# Patient Record
Sex: MALE | Race: WHITE | Employment: FULL TIME | URBAN - METROPOLITAN AREA
[De-identification: names, ages, dates, MRNs, and addresses within clinical notes are randomized per-mention and may not be internally consistent; named-entity substitution may affect disease eponyms.]

---

## 2017-01-03 PROBLEM — G43.109 COMPLICATED MIGRAINE: Status: ACTIVE | Noted: 2017-01-03

## 2017-01-03 NOTE — PROGRESS NOTES
Eloisa [address]   Neurology;   Follow up  CLINIC VISIT  2017    Amaury Vasques Patient Status:  No patient class for patient encounter    1990 MRN [id number]   Location [address] [place] [address] BETTIE Romero DO     REASON FOR COLONOSCOPY         FAMILY HISTORY:  family history includes Cancer in his maternal grandfather; Hypertension in his maternal grandmother. SOCIAL HISTORY:   reports that he has never smoked.  He has never used smokeless tobacco. He reports that he does n conjunctiva, no abnormal secretion,   Neck supple,  No carotid bruit,  thyroid normal  Lungs are clear to auscultation  Heart: normal SR, no murmur  Extremities:  No edema or cyanosis, pulse is normal.  Skin:  No unusual lesions    Neurologic Examination: quadrant         Relevant Medications     Amitriptyline HCl 100 MG Oral Tab           Impression/Plan:  (F51.01) Primary insomnia  (primary encounter diagnosis)    (G44.029) Chronic cluster headache, not intractable    (R20.2) Tingling in extremities    (H

## 2017-01-03 NOTE — PATIENT INSTRUCTIONS
Refill policies:    • Allow 2 business days for refills; controlled substances may take longer.   • Contact your pharmacy at least 5 days prior to running out of medication and have them send an electronic request or submit request through the “request re your physician has recommended that you have a procedure or additional testing performed. [facility]) will contact your insurance carrier to obtain pre-certification or prior authorization.     Unfortunately, ROS has seen an increas

## 2017-02-01 ENCOUNTER — HOSPITAL ENCOUNTER (OUTPATIENT)
Dept: NUCLEAR MEDICINE | Facility: HOSPITAL | Age: 27
Discharge: HOME | End: 2017-02-01
Attending: INTERNAL MEDICINE
Payer: COMMERCIAL

## 2017-02-01 DIAGNOSIS — R19.7 DIARRHEA: ICD-10-CM

## 2017-02-01 DIAGNOSIS — R10.12 ABDOMINAL PAIN, LEFT UPPER QUADRANT: ICD-10-CM

## 2017-02-01 PROCEDURE — 78227 HEPATOBIL SYST IMAGE W/DRUG: CPT

## 2017-02-03 NOTE — PROGRESS NOTES
Quick Note:    The gallbladder emptying scan showed a normal GB ejection fraction of 63%. Mild reproduction of abdominal pain is noted. Please have the pt do an abdominal ultrasound to assess for gallstones or other hepatobiliary pathology.     Proceed

## 2017-04-14 PROCEDURE — 88305 TISSUE EXAM BY PATHOLOGIST: CPT | Performed by: INTERNAL MEDICINE

## 2017-05-11 ENCOUNTER — PATIENT MESSAGE (OUTPATIENT)
Dept: NEUROLOGY | Facility: CLINIC | Age: 27
End: 2017-05-11

## 2017-05-11 RX ORDER — AMITRIPTYLINE HYDROCHLORIDE 100 MG/1
TABLET, FILM COATED ORAL
Qty: 30 TABLET | Refills: 0 | Status: SHIPPED | OUTPATIENT
Start: 2017-05-11 | End: 2017-06-20

## 2017-05-11 NOTE — TELEPHONE ENCOUNTER
From: Cara Mabry MA  To: Agustin Guerra  Sent: 5/11/2017 8:05 AM CDT  Subject: appointment and medication refill    Liang Ramirez,    We receive a refill request on medication Amitriptyline .  According to our records you are due for appointment with one of o

## 2017-05-22 PROCEDURE — 83655 ASSAY OF LEAD: CPT | Performed by: INTERNAL MEDICINE

## 2017-06-20 DIAGNOSIS — G44.029 CHRONIC CLUSTER HEADACHE, NOT INTRACTABLE: Primary | ICD-10-CM

## 2017-06-20 RX ORDER — AMITRIPTYLINE HYDROCHLORIDE 100 MG/1
TABLET, FILM COATED ORAL
Qty: 30 TABLET | Refills: 0 | Status: SHIPPED | OUTPATIENT
Start: 2017-06-20 | End: 2017-06-26

## 2017-06-20 NOTE — TELEPHONE ENCOUNTER
Medication: Amitriptyline 100 mg     Date of last refill: 5/11/17  [place]    Last office visit: 1/3/17  Due back to clinic per last office note: 3 months   Date next office visit scheduled: 6/26/17    Last OV note re

## 2017-06-24 PROCEDURE — 36415 COLL VENOUS BLD VENIPUNCTURE: CPT | Performed by: INTERNAL MEDICINE

## 2017-06-24 PROCEDURE — 85048 AUTOMATED LEUKOCYTE COUNT: CPT | Performed by: INTERNAL MEDICINE

## 2017-06-26 ENCOUNTER — OFFICE VISIT (OUTPATIENT)
Dept: NEUROLOGY | Facility: CLINIC | Age: 27
End: 2017-06-26

## 2017-06-26 VITALS
SYSTOLIC BLOOD PRESSURE: 117 MMHG | HEART RATE: 91 BPM | DIASTOLIC BLOOD PRESSURE: 79 MMHG | BODY MASS INDEX: 30 KG/M2 | WEIGHT: 217 LBS | RESPIRATION RATE: 14 BRPM

## 2017-06-26 DIAGNOSIS — G43.109 COMPLICATED MIGRAINE: ICD-10-CM

## 2017-06-26 DIAGNOSIS — M54.6 MIDLINE THORACIC BACK PAIN, UNSPECIFIED CHRONICITY: ICD-10-CM

## 2017-06-26 DIAGNOSIS — F51.01 PRIMARY INSOMNIA: ICD-10-CM

## 2017-06-26 DIAGNOSIS — G44.029 CHRONIC CLUSTER HEADACHE, NOT INTRACTABLE: ICD-10-CM

## 2017-06-26 DIAGNOSIS — R20.2 TINGLING IN EXTREMITIES: Primary | ICD-10-CM

## 2017-06-26 DIAGNOSIS — G44.1 OTHER VASCULAR HEADACHE: ICD-10-CM

## 2017-06-26 PROCEDURE — 99214 OFFICE O/P EST MOD 30 MIN: CPT | Performed by: OTHER

## 2017-06-26 RX ORDER — AMITRIPTYLINE HYDROCHLORIDE 100 MG/1
TABLET, FILM COATED ORAL
Qty: 30 TABLET | Refills: 11 | Status: SHIPPED | OUTPATIENT
Start: 2017-06-26 | End: 2018-06-25

## 2017-06-26 NOTE — PATIENT INSTRUCTIONS
Refill policies:    • Allow 2-3 business days for refills; controlled substances may take longer.   • Contact your pharmacy at least 5 days prior to running out of medication and have them send an electronic request or submit request through the [facility] have a procedure or additional testing performed. [facility]) will contact your insurance carrier to obtain pre-certification or prior authorization.     Unfortunately, ROS has seen an increase in denial of payment even though the p

## 2017-06-26 NOTE — PROGRESS NOTES
Eloisa [address]   Neurology;   Follow up  CLINIC VISIT  2017    Sascha Perez Patient Status:  No patient class for patient encounter    1990 MRN [id number]   Location [address] [place] [address] Rose Mary Rivero DO     REASON FOR smoked. He has never used smokeless tobacco. He reports that he does not drink alcohol or use drugs.     ALLERGIES:  No Known Allergies    MEDICATIONS:    Current Outpatient Prescriptions:  Amitriptyline HCl 100 MG Oral Tab TAKE 1 TABLET(100 MG) BY MOUTH EV old male in no acute distress. appearance: Normal developed and well nourished , in no acute stress;   HEENT:  Normal conjunctiva, no abnormal secretion,   Neck supple,  No carotid bruit,  thyroid normal  Lungs are clear to auscultation  Heart: normal SR, Insomnia disorder    Complicated migraine      Other Visit Diagnoses    None.          Impression/Plan:  (R20.2) Tingling in extremities  (primary encounter diagnosis)    (F51.01) Primary insomnia    (G44.1) Other vascular headache    (G44.029) Chronic clus

## 2017-12-07 ENCOUNTER — TELEPHONE (OUTPATIENT)
Dept: NEUROLOGY | Facility: CLINIC | Age: 27
End: 2017-12-07

## 2017-12-07 NOTE — TELEPHONE ENCOUNTER
Per Dr Yashira Mac : Can call for him based on my last note. Spoke with representative at [facility]. She initiated process for Neurology referral. She will give patient a call to complete referral questionnaire.      Left a detailed message on patient's

## 2017-12-07 NOTE — TELEPHONE ENCOUNTER
Regarding: Referral Request  Contact: [phone]  ----- Message from Juanita Ramírez RN sent at 12/6/2017  8:46 AM CST -----       ----- Message from Charlotte Will to Ottoniel Martinez MD sent at 11/19/2017  9:23 PM -----   Dr. Bon Ohara,    When we last met, you agr

## 2018-01-09 ENCOUNTER — TELEPHONE (OUTPATIENT)
Dept: NEUROLOGY | Facility: CLINIC | Age: 28
End: 2018-01-09

## 2018-05-07 ENCOUNTER — OFFICE VISIT (OUTPATIENT)
Dept: INTERNAL MEDICINE CLINIC | Facility: CLINIC | Age: 28
End: 2018-05-07

## 2018-05-07 VITALS
HEIGHT: 71 IN | WEIGHT: 211 LBS | TEMPERATURE: 98 F | BODY MASS INDEX: 29.54 KG/M2 | HEART RATE: 111 BPM | OXYGEN SATURATION: 98 % | DIASTOLIC BLOOD PRESSURE: 76 MMHG | SYSTOLIC BLOOD PRESSURE: 116 MMHG

## 2018-05-07 DIAGNOSIS — Z00.00 ANNUAL PHYSICAL EXAM: ICD-10-CM

## 2018-05-07 DIAGNOSIS — R21 RASH: Primary | ICD-10-CM

## 2018-05-07 PROCEDURE — 99212 OFFICE O/P EST SF 10 MIN: CPT | Performed by: INTERNAL MEDICINE

## 2018-05-07 PROCEDURE — 99213 OFFICE O/P EST LOW 20 MIN: CPT | Performed by: INTERNAL MEDICINE

## 2018-05-07 RX ORDER — ELECTROLYTES/DEXTROSE
SOLUTION, ORAL ORAL
COMMUNITY
Start: 2018-05-01

## 2018-05-07 RX ORDER — GABAPENTIN 300 MG/1
300 CAPSULE ORAL 3 TIMES DAILY
Refills: 3 | COMMUNITY
Start: 2018-04-06 | End: 2018-06-25

## 2018-05-07 RX ORDER — NYSTATIN 10B UNIT
1 POWDER (EA) MISCELLANEOUS 3 TIMES DAILY
Qty: 1 BOTTLE | Refills: 0 | Status: SHIPPED | OUTPATIENT
Start: 2018-05-07 | End: 2018-06-25

## 2018-05-07 RX ORDER — ACETAMINOPHEN 500 MG
500 TABLET ORAL EVERY 6 HOURS PRN
COMMUNITY

## 2018-05-08 NOTE — PROGRESS NOTES
Maite Fonseca is a 33year old male. Patient presents with: Other: Complaints of dry skin and discoloration under B/L underarms w/ left being worse than right. HPI:   Maite Fonseca is a 33year old male who presents for: discoloration under armpit. REVIEW OF SYSTEMS:   GENERAL: feels well otherwise  SKIN: reports rash    EXAM:   /76 (BP Location: Left arm, Patient Position: Sitting, Cuff Size: adult)   Pulse 111   Temp 98.4 °F (36.9 °C) (Oral)   Ht 5' 11\" (1.803 m)   Wt 211 lb (9

## 2018-05-19 ENCOUNTER — LAB ENCOUNTER (OUTPATIENT)
Dept: LAB | Facility: HOSPITAL | Age: 28
End: 2018-05-19
Attending: INTERNAL MEDICINE
Payer: COMMERCIAL

## 2018-05-19 DIAGNOSIS — Z00.00 ANNUAL PHYSICAL EXAM: ICD-10-CM

## 2018-05-19 PROCEDURE — 83036 HEMOGLOBIN GLYCOSYLATED A1C: CPT

## 2018-05-19 PROCEDURE — 80061 LIPID PANEL: CPT

## 2018-05-19 PROCEDURE — 80053 COMPREHEN METABOLIC PANEL: CPT

## 2018-05-19 PROCEDURE — 36415 COLL VENOUS BLD VENIPUNCTURE: CPT

## 2018-05-19 PROCEDURE — 85025 COMPLETE CBC W/AUTO DIFF WBC: CPT

## 2018-05-21 ENCOUNTER — TELEPHONE (OUTPATIENT)
Dept: INTERNAL MEDICINE CLINIC | Facility: CLINIC | Age: 28
End: 2018-05-21

## 2018-06-25 ENCOUNTER — OFFICE VISIT (OUTPATIENT)
Dept: NEUROLOGY | Facility: CLINIC | Age: 28
End: 2018-06-25

## 2018-06-25 VITALS
HEART RATE: 96 BPM | RESPIRATION RATE: 16 BRPM | BODY MASS INDEX: 29.68 KG/M2 | HEIGHT: 71 IN | WEIGHT: 212 LBS | SYSTOLIC BLOOD PRESSURE: 110 MMHG | DIASTOLIC BLOOD PRESSURE: 80 MMHG

## 2018-06-25 DIAGNOSIS — F51.01 PRIMARY INSOMNIA: ICD-10-CM

## 2018-06-25 DIAGNOSIS — G44.029 CHRONIC CLUSTER HEADACHE, NOT INTRACTABLE: ICD-10-CM

## 2018-06-25 DIAGNOSIS — M54.6 MIDLINE THORACIC BACK PAIN, UNSPECIFIED CHRONICITY: ICD-10-CM

## 2018-06-25 DIAGNOSIS — G43.109 COMPLICATED MIGRAINE: ICD-10-CM

## 2018-06-25 DIAGNOSIS — R20.2 TINGLING IN EXTREMITIES: Primary | ICD-10-CM

## 2018-06-25 PROCEDURE — 99215 OFFICE O/P EST HI 40 MIN: CPT | Performed by: OTHER

## 2018-06-25 RX ORDER — AMITRIPTYLINE HYDROCHLORIDE 100 MG/1
TABLET, FILM COATED ORAL
Qty: 30 TABLET | Refills: 11 | Status: SHIPPED | OUTPATIENT
Start: 2018-06-25 | End: 2019-05-14

## 2018-06-25 RX ORDER — GABAPENTIN 300 MG/1
600 CAPSULE ORAL 3 TIMES DAILY
Qty: 180 CAPSULE | Refills: 11 | Status: SHIPPED | OUTPATIENT
Start: 2018-06-25 | End: 2019-05-14

## 2018-06-25 NOTE — PROGRESS NOTES
Eloisa [address]27   Neurology;   Follow up  CLINIC VISIT  2018    Shayy Coon Patient Status:  No patient class for patient encounter    1990 MRN [id number]   Location [address] [place] [address] Sharon Come, DO     REASON FOR [id number], [id number];  Surgeon: Merly Arana MD;  Location: [facility] [place]    FAMILY HISTORY:  family history includes Cancer in his maternal grandfather; Hypertension in his maternal grandmother.     SOCIAL HISTORY:   reports that he h EXAMINATION:  VITAL SIGNS: /80 (BP Location: Right arm, Patient Position: Sitting, Cuff Size: adult)   Pulse 96   Resp 16   Ht 71\"   Wt 212 lb   BMI 29.57 kg/m²    Body mass index is 29.57 kg/m².   General:  Patient is a 33year old male in no acute thoracic and Lumbar spine last year normal report,     Reviewed all consultation letters from [facility] clinic with him.    EMG was normal there    Problem List Items Addressed This Visit     HA (headache)    Relevant Medications    Amitriptyline HCl 100 MG Oral treatment. The patient was  given ample opportunity to ask questions. All questions and concerns were addressed.      Yosef Hannah MD  General Neurology   Neuromuscular/ Electrodiagnostic Specialist  [facility]  06/25/2018

## 2018-06-25 NOTE — PROGRESS NOTES
Patient is here for neuropathy. Patient stated that he went to [facility] clinic in early April and wants to go over everything with Dr. Anant Basilio.

## 2018-11-28 ENCOUNTER — TELEPHONE (OUTPATIENT)
Dept: INTERNAL MEDICINE CLINIC | Facility: CLINIC | Age: 28
End: 2018-11-28

## 2018-11-28 NOTE — TELEPHONE ENCOUNTER
Called patient and relayed  Message - verbalized understanding. Number and address for DR. Jeana Roberts given to patient .  Cielo for 11/29 cancelled with

## 2018-11-28 NOTE — TELEPHONE ENCOUNTER
I saw the reason for the appointment for tomorrow; he would be better served by seeing a urologist: would recommend Dr. Yg Sage with [facility].

## 2019-05-13 ENCOUNTER — TELEPHONE (OUTPATIENT)
Dept: NEUROLOGY | Facility: CLINIC | Age: 29
End: 2019-05-13

## 2019-05-14 ENCOUNTER — OFFICE VISIT (OUTPATIENT)
Dept: NEUROLOGY | Facility: CLINIC | Age: 29
End: 2019-05-14
Payer: COMMERCIAL

## 2019-05-14 VITALS
SYSTOLIC BLOOD PRESSURE: 122 MMHG | DIASTOLIC BLOOD PRESSURE: 70 MMHG | WEIGHT: 198 LBS | HEART RATE: 68 BPM | RESPIRATION RATE: 16 BRPM | BODY MASS INDEX: 28 KG/M2

## 2019-05-14 DIAGNOSIS — R20.2 TINGLING IN EXTREMITIES: ICD-10-CM

## 2019-05-14 DIAGNOSIS — G44.029 CHRONIC CLUSTER HEADACHE, NOT INTRACTABLE: ICD-10-CM

## 2019-05-14 DIAGNOSIS — H92.01 CHRONIC RIGHT EAR PAIN: ICD-10-CM

## 2019-05-14 DIAGNOSIS — F51.01 PRIMARY INSOMNIA: ICD-10-CM

## 2019-05-14 DIAGNOSIS — M54.6 ACUTE BILATERAL THORACIC BACK PAIN: Primary | ICD-10-CM

## 2019-05-14 DIAGNOSIS — G43.109 COMPLICATED MIGRAINE: ICD-10-CM

## 2019-05-14 DIAGNOSIS — G89.29 CHRONIC RIGHT EAR PAIN: ICD-10-CM

## 2019-05-14 PROCEDURE — 99214 OFFICE O/P EST MOD 30 MIN: CPT | Performed by: OTHER

## 2019-05-14 RX ORDER — GABAPENTIN 300 MG/1
600 CAPSULE ORAL 3 TIMES DAILY
Qty: 180 CAPSULE | Refills: 11 | Status: SHIPPED | OUTPATIENT
Start: 2019-05-14 | End: 2020-04-23

## 2019-05-14 RX ORDER — AMITRIPTYLINE HYDROCHLORIDE 100 MG/1
TABLET, FILM COATED ORAL
Qty: 30 TABLET | Refills: 11 | Status: SHIPPED | OUTPATIENT
Start: 2019-05-14 | End: 2020-04-23

## 2019-05-14 NOTE — PROGRESS NOTES
[facility]   Neurology;   Follow up  CLINIC VISIT  2019    Marleny Wilson Patient Status:  No patient class for patient encounter    1990 MRN [id number]   Location [facility] PCP Aminta Coles DO     REASON FOR T grandfather; Hypertension in his maternal grandmother. SOCIAL HISTORY:   reports that he has never smoked. He has never used smokeless tobacco. He reports that he does not drink alcohol or use drugs.     ALLERGIES:  No Known Allergies    MEDICATIONS: auscultation  Heart: normal SR, no murmur  Extremities:  No edema or cyanosis, pulse is normal.  Skin:  No unusual lesions    Neurologic Examination:  Mental status: he is awake, alert, oriented to time, name and place, Mentally appropriate  for age;   Lencho Rosenbaum extremities          Impression/Plan:  (M54.6) Acute bilateral thoracic back pain  (primary encounter diagnosis)    (H92.01,  G89.29) Chronic right ear pain    (B24.334) Complicated migraine  Plan: CBC WITH DIFFERENTIAL WITH PLATELET, COMP         METABOLI

## 2019-06-05 ENCOUNTER — LAB ENCOUNTER (OUTPATIENT)
Dept: LAB | Age: 29
End: 2019-06-05
Attending: Other
Payer: COMMERCIAL

## 2019-06-05 DIAGNOSIS — G43.109 COMPLICATED MIGRAINE: ICD-10-CM

## 2019-06-05 PROCEDURE — 85025 COMPLETE CBC W/AUTO DIFF WBC: CPT | Performed by: OTHER

## 2019-06-05 PROCEDURE — 80053 COMPREHEN METABOLIC PANEL: CPT | Performed by: OTHER

## 2019-06-05 PROCEDURE — 36415 COLL VENOUS BLD VENIPUNCTURE: CPT | Performed by: OTHER

## 2019-06-06 ENCOUNTER — TELEPHONE (OUTPATIENT)
Dept: NEUROLOGY | Facility: CLINIC | Age: 29
End: 2019-06-06

## 2019-06-12 ENCOUNTER — TELEPHONE (OUTPATIENT)
Dept: NEUROLOGY | Facility: CLINIC | Age: 29
End: 2019-06-12

## 2020-04-23 NOTE — PROGRESS NOTES
This consultation is conducted as a video visit after consent was obtained from patient       HISTORY OF PRESENT ILLNESS:  Aidan Regan is a(n) 30year old, right handed,  male who presents for HA. He was last seen in May 2019.  He has he has intermitten neck  Sleep better  Will refill elavil 100 mg qhs, Neurontin 600 mg tid,   Check labs in June   See him in a year, give him refills. We discussed in depth regarding diagnosis, prognosis, treatment. Side effect of medications were discussed in details.

## 2021-03-09 ENCOUNTER — LAB ENCOUNTER (OUTPATIENT)
Dept: LAB | Age: 31
End: 2021-03-09
Attending: Other
Payer: COMMERCIAL

## 2021-03-09 DIAGNOSIS — G44.029 CHRONIC CLUSTER HEADACHE, NOT INTRACTABLE: ICD-10-CM

## 2021-03-09 LAB
ALBUMIN SERPL-MCNC: 4.2 G/DL (ref 3.4–5)
ALBUMIN/GLOB SERPL: 1.3 {RATIO} (ref 1–2)
ALP LIVER SERPL-CCNC: 46 U/L
ALT SERPL-CCNC: 43 U/L
ANION GAP SERPL CALC-SCNC: 5 MMOL/L (ref 0–18)
AST SERPL-CCNC: 19 U/L (ref 15–37)
BASOPHILS # BLD AUTO: 0.03 X10(3) UL (ref 0–0.2)
BASOPHILS NFR BLD AUTO: 0.5 %
BILIRUB SERPL-MCNC: 1.1 MG/DL (ref 0.1–2)
BUN BLD-MCNC: 10 MG/DL (ref 7–18)
BUN/CREAT SERPL: 12 (ref 10–20)
CALCIUM BLD-MCNC: 9.2 MG/DL (ref 8.5–10.1)
CHLORIDE SERPL-SCNC: 106 MMOL/L (ref 98–112)
CO2 SERPL-SCNC: 28 MMOL/L (ref 21–32)
CREAT BLD-MCNC: 0.83 MG/DL
DEPRECATED RDW RBC AUTO: 37.5 FL (ref 35.1–46.3)
EOSINOPHIL # BLD AUTO: 0.11 X10(3) UL (ref 0–0.7)
EOSINOPHIL NFR BLD AUTO: 1.9 %
ERYTHROCYTE [DISTWIDTH] IN BLOOD BY AUTOMATED COUNT: 12.4 % (ref 11–15)
GLOBULIN PLAS-MCNC: 3.2 G/DL (ref 2.8–4.4)
GLUCOSE BLD-MCNC: 81 MG/DL (ref 70–99)
HCT VFR BLD AUTO: 41.3 %
HGB BLD-MCNC: 14.2 G/DL
IMM GRANULOCYTES # BLD AUTO: 0.01 X10(3) UL (ref 0–1)
IMM GRANULOCYTES NFR BLD: 0.2 %
LYMPHOCYTES # BLD AUTO: 2.07 X10(3) UL (ref 1–4)
LYMPHOCYTES NFR BLD AUTO: 34.8 %
M PROTEIN MFR SERPL ELPH: 7.4 G/DL (ref 6.4–8.2)
MCH RBC QN AUTO: 29.1 PG (ref 26–34)
MCHC RBC AUTO-ENTMCNC: 34.4 G/DL (ref 31–37)
MCV RBC AUTO: 84.6 FL
MONOCYTES # BLD AUTO: 0.43 X10(3) UL (ref 0.1–1)
MONOCYTES NFR BLD AUTO: 7.2 %
NEUTROPHILS # BLD AUTO: 3.29 X10 (3) UL (ref 1.5–7.7)
NEUTROPHILS # BLD AUTO: 3.29 X10(3) UL (ref 1.5–7.7)
NEUTROPHILS NFR BLD AUTO: 55.4 %
OSMOLALITY SERPL CALC.SUM OF ELEC: 286 MOSM/KG (ref 275–295)
PATIENT FASTING Y/N/NP: YES
PLATELET # BLD AUTO: 276 10(3)UL (ref 150–450)
POTASSIUM SERPL-SCNC: 3.7 MMOL/L (ref 3.5–5.1)
RBC # BLD AUTO: 4.88 X10(6)UL
SODIUM SERPL-SCNC: 139 MMOL/L (ref 136–145)
WBC # BLD AUTO: 5.9 X10(3) UL (ref 4–11)

## 2021-03-09 PROCEDURE — 80053 COMPREHEN METABOLIC PANEL: CPT | Performed by: OTHER

## 2021-03-09 PROCEDURE — 85025 COMPLETE CBC W/AUTO DIFF WBC: CPT | Performed by: OTHER

## 2021-03-09 PROCEDURE — 36415 COLL VENOUS BLD VENIPUNCTURE: CPT | Performed by: OTHER

## 2021-03-10 NOTE — PROGRESS NOTES
Dear Nik Byrne,    Dr. James Wilkinson has reviewed the bloodwork you completed recently and your results were normal. You will be able to go over the test results in more detail at your next office visit.      If you have any questions, please contact our office at 61

## 2021-03-31 DIAGNOSIS — R20.2 TINGLING IN EXTREMITIES: Primary | ICD-10-CM

## 2021-03-31 RX ORDER — GABAPENTIN 300 MG/1
600 CAPSULE ORAL 3 TIMES DAILY
Qty: 180 CAPSULE | Refills: 0 | Status: SHIPPED | OUTPATIENT
Start: 2021-03-31 | End: 2021-04-02

## 2021-03-31 NOTE — TELEPHONE ENCOUNTER
Medication: Gabapentin    Date of last refill: 4/23/20 (#180/11)  [place]     Last office visit: 4/23/2020  Due back to clinic per last office note:  1 year  Date next office visit scheduled:    Future Appointments   Misbah

## 2021-04-02 ENCOUNTER — LAB ENCOUNTER (OUTPATIENT)
Dept: LAB | Age: 31
End: 2021-04-02
Attending: Other
Payer: COMMERCIAL

## 2021-04-02 ENCOUNTER — OFFICE VISIT (OUTPATIENT)
Dept: NEUROLOGY | Facility: CLINIC | Age: 31
End: 2021-04-02
Payer: COMMERCIAL

## 2021-04-02 VITALS
DIASTOLIC BLOOD PRESSURE: 78 MMHG | WEIGHT: 197 LBS | BODY MASS INDEX: 27 KG/M2 | HEART RATE: 78 BPM | RESPIRATION RATE: 14 BRPM | SYSTOLIC BLOOD PRESSURE: 124 MMHG

## 2021-04-02 DIAGNOSIS — G44.019 EPISODIC CLUSTER HEADACHE, NOT INTRACTABLE: Primary | ICD-10-CM

## 2021-04-02 DIAGNOSIS — M54.6 MIDLINE THORACIC BACK PAIN, UNSPECIFIED CHRONICITY: ICD-10-CM

## 2021-04-02 DIAGNOSIS — G43.109 COMPLICATED MIGRAINE: ICD-10-CM

## 2021-04-02 DIAGNOSIS — R20.2 TINGLING IN EXTREMITIES: ICD-10-CM

## 2021-04-02 DIAGNOSIS — F51.01 PRIMARY INSOMNIA: ICD-10-CM

## 2021-04-02 DIAGNOSIS — G44.029 CHRONIC CLUSTER HEADACHE, NOT INTRACTABLE: ICD-10-CM

## 2021-04-02 PROCEDURE — 3074F SYST BP LT 130 MM HG: CPT | Performed by: OTHER

## 2021-04-02 PROCEDURE — 3078F DIAST BP <80 MM HG: CPT | Performed by: OTHER

## 2021-04-02 PROCEDURE — 86431 RHEUMATOID FACTOR QUANT: CPT | Performed by: OTHER

## 2021-04-02 PROCEDURE — 99214 OFFICE O/P EST MOD 30 MIN: CPT | Performed by: OTHER

## 2021-04-02 PROCEDURE — 36415 COLL VENOUS BLD VENIPUNCTURE: CPT | Performed by: OTHER

## 2021-04-02 PROCEDURE — 85652 RBC SED RATE AUTOMATED: CPT | Performed by: OTHER

## 2021-04-02 PROCEDURE — 86038 ANTINUCLEAR ANTIBODIES: CPT | Performed by: OTHER

## 2021-04-02 RX ORDER — GABAPENTIN 300 MG/1
600 CAPSULE ORAL 3 TIMES DAILY
Qty: 180 CAPSULE | Refills: 11 | Status: SHIPPED | OUTPATIENT
Start: 2021-04-02

## 2021-04-02 RX ORDER — GABAPENTIN 300 MG/1
600 CAPSULE ORAL 3 TIMES DAILY
Qty: 180 CAPSULE | Refills: 0 | Status: SHIPPED | OUTPATIENT
Start: 2021-04-02 | End: 2021-04-02

## 2021-04-02 RX ORDER — AMITRIPTYLINE HYDROCHLORIDE 100 MG/1
TABLET, FILM COATED ORAL
Qty: 30 TABLET | Refills: 11 | Status: SHIPPED | OUTPATIENT
Start: 2021-04-02

## 2021-04-02 NOTE — PROGRESS NOTES
[facility]   Neurology;   Follow up  CLINIC VISIT  2021    Darinel Mueller Patient Status:  No patient class for patient encounter    1990 MRN [id number]   Location [address] [place] [address] Jean Carlos Pardo DO     REASON FOR TH lupus, last year.          PAST MEDICAL HISTORY:  Past Medical History:   Diagnosis Date   • History of abdominal pain    • Insomnia        PAST SURGICAL HISTORY:  Past Surgical History:   Procedure Laterality Date   • COLONOSCOPY     • ESOPHAGOGASTRODUODE bleeding  ENDOCRINE: denies excessive thirst or urination; denies unexpected wt gain or wt loss  ALLERGY/IMM.: denies food or seasonal allergies      PHYSICAL EXAMINATION:  VITAL SIGNS: /78 (BP Location: Right arm, Patient Position: Sitting, Cuff Siz paper file today, I reviewed those,   LABS:  TSH, B12, ESR, ROBERT normal,   Porphyria normal,     IMAGING:  MRI brain and cervical spine is normal,   MRI of thoracic and Lumbar spine last year normal report,     Reviewed all consultation letters from [facility] elavil 100 mg qhs, Neurontin 600 mg tid,   Check labs normal   See him in a year, give him refills.    Side effect was discussed,       Return in about 1 year (around 4/2/2022).   To adjust dose    We discussed in depth regarding diagnosis, prognosis, treat

## 2021-05-19 NOTE — PROGRESS NOTES
Flako Lion is a 35year old malePatient presents with:  Physical: Annual physical. Pt would like to talk to MD regarding his father recently being diagnosed with heart failure and aortic valce stenosis.        HPI:     Flako Lion is a 35year old male throat      Social History:  Social History    Tobacco Use      Smoking status: Never Smoker      Smokeless tobacco: Never Used    Vaping Use      Vaping Use: Never used    Alcohol use: No      Alcohol/week: 0.0 standard drinks    Drug use: No          REV

## 2021-05-20 ENCOUNTER — OFFICE VISIT (OUTPATIENT)
Dept: INTERNAL MEDICINE CLINIC | Facility: CLINIC | Age: 31
End: 2021-05-20
Payer: COMMERCIAL

## 2021-05-20 VITALS
DIASTOLIC BLOOD PRESSURE: 78 MMHG | BODY MASS INDEX: 28.28 KG/M2 | OXYGEN SATURATION: 98 % | HEART RATE: 108 BPM | RESPIRATION RATE: 16 BRPM | SYSTOLIC BLOOD PRESSURE: 120 MMHG | HEIGHT: 71 IN | WEIGHT: 202 LBS | TEMPERATURE: 98 F

## 2021-05-20 DIAGNOSIS — Z82.79 FAMILY HISTORY OF FIRST DEGREE RELATIVE WITH BICUSPID AORTIC VALVE: ICD-10-CM

## 2021-05-20 DIAGNOSIS — G43.109 COMPLICATED MIGRAINE: ICD-10-CM

## 2021-05-20 DIAGNOSIS — Z23 NEED FOR VACCINATION: ICD-10-CM

## 2021-05-20 DIAGNOSIS — Z00.00 ANNUAL PHYSICAL EXAM: Primary | ICD-10-CM

## 2021-05-20 PROCEDURE — 3074F SYST BP LT 130 MM HG: CPT | Performed by: INTERNAL MEDICINE

## 2021-05-20 PROCEDURE — 3008F BODY MASS INDEX DOCD: CPT | Performed by: INTERNAL MEDICINE

## 2021-05-20 PROCEDURE — 3078F DIAST BP <80 MM HG: CPT | Performed by: INTERNAL MEDICINE

## 2021-05-20 PROCEDURE — 99385 PREV VISIT NEW AGE 18-39: CPT | Performed by: INTERNAL MEDICINE

## 2021-05-20 NOTE — PATIENT INSTRUCTIONS
1) please send a [employer] message with your prior vaccinations  2) return in 1-2 weeks for your tetanus booster  3) call to schedule your blood tests and echocardiogram  4) please try to adhere to a healthy diet and regular exercise regimen

## 2021-05-27 ENCOUNTER — NURSE ONLY (OUTPATIENT)
Dept: INTERNAL MEDICINE CLINIC | Facility: CLINIC | Age: 31
End: 2021-05-27
Payer: COMMERCIAL

## 2021-05-27 ENCOUNTER — TELEPHONE (OUTPATIENT)
Dept: INTERNAL MEDICINE CLINIC | Facility: CLINIC | Age: 31
End: 2021-05-27

## 2021-05-27 ENCOUNTER — LAB ENCOUNTER (OUTPATIENT)
Dept: LAB | Age: 31
End: 2021-05-27
Attending: INTERNAL MEDICINE
Payer: COMMERCIAL

## 2021-05-27 DIAGNOSIS — Z00.00 ANNUAL PHYSICAL EXAM: ICD-10-CM

## 2021-05-27 PROCEDURE — 90471 IMMUNIZATION ADMIN: CPT | Performed by: INTERNAL MEDICINE

## 2021-05-27 PROCEDURE — 84443 ASSAY THYROID STIM HORMONE: CPT

## 2021-05-27 PROCEDURE — 80061 LIPID PANEL: CPT

## 2021-05-27 PROCEDURE — 82947 ASSAY GLUCOSE BLOOD QUANT: CPT

## 2021-05-27 PROCEDURE — 36415 COLL VENOUS BLD VENIPUNCTURE: CPT

## 2021-05-27 PROCEDURE — 90715 TDAP VACCINE 7 YRS/> IM: CPT | Performed by: INTERNAL MEDICINE

## 2021-05-27 NOTE — TELEPHONE ENCOUNTER
Pt is a 72-year-old white female history of Alzheimer's disease, CAD, HTN, Obstructive Sleep Apnea, and obesity presents to the [facility] ED via EMS with SOB and lethargy.  Hx of this in the past - recently admitted and transferred to an outlying facility due to elevated troponins.  Pt is alert, will wake up when prompted, is talking in complete sentences. DNR per previous ER records and confirmed by EMS.   Yes--should be TDAP

## 2021-05-27 NOTE — TELEPHONE ENCOUNTER
Dr. Umu Mccarthy, please advise. Patient is on the nurse lab schedule today for a \"TB shot. \" I see your 5/20/21 note that he will be due for tetanus shot. Please advise on order for nurse visit today. Thank you.

## 2021-06-19 ENCOUNTER — HOSPITAL ENCOUNTER (OUTPATIENT)
Dept: CV DIAGNOSTICS | Facility: HOSPITAL | Age: 31
Discharge: HOME | End: 2021-06-19
Attending: INTERNAL MEDICINE
Payer: COMMERCIAL

## 2021-06-19 DIAGNOSIS — Z82.79 FAMILY HISTORY OF FIRST DEGREE RELATIVE WITH BICUSPID AORTIC VALVE: ICD-10-CM

## 2021-06-19 PROCEDURE — 93306 TTE W/DOPPLER COMPLETE: CPT | Performed by: INTERNAL MEDICINE

## 2021-12-30 NOTE — TELEPHONE ENCOUNTER
Noted. Concetta Soni already ordered from previous visit on 5/20/21. Reason for nurse visit updated. Sore throat

## 2022-03-31 ENCOUNTER — OFFICE VISIT (OUTPATIENT)
Dept: NEUROLOGY | Facility: CLINIC | Age: 32
End: 2022-03-31
Payer: COMMERCIAL

## 2022-03-31 VITALS
SYSTOLIC BLOOD PRESSURE: 124 MMHG | BODY MASS INDEX: 27 KG/M2 | DIASTOLIC BLOOD PRESSURE: 70 MMHG | WEIGHT: 193 LBS | HEART RATE: 88 BPM | RESPIRATION RATE: 16 BRPM

## 2022-03-31 DIAGNOSIS — M54.6 ACUTE MIDLINE THORACIC BACK PAIN: Primary | ICD-10-CM

## 2022-03-31 DIAGNOSIS — G44.029 CHRONIC CLUSTER HEADACHE, NOT INTRACTABLE: ICD-10-CM

## 2022-03-31 DIAGNOSIS — R20.2 TINGLING IN EXTREMITIES: ICD-10-CM

## 2022-03-31 DIAGNOSIS — G43.109 COMPLICATED MIGRAINE: ICD-10-CM

## 2022-03-31 DIAGNOSIS — F51.01 PRIMARY INSOMNIA: ICD-10-CM

## 2022-03-31 PROCEDURE — 99213 OFFICE O/P EST LOW 20 MIN: CPT | Performed by: OTHER

## 2022-03-31 PROCEDURE — 3074F SYST BP LT 130 MM HG: CPT | Performed by: OTHER

## 2022-03-31 PROCEDURE — 3078F DIAST BP <80 MM HG: CPT | Performed by: OTHER

## 2022-03-31 RX ORDER — GABAPENTIN 300 MG/1
600 CAPSULE ORAL 3 TIMES DAILY
Qty: 540 CAPSULE | Refills: 3 | Status: SHIPPED | OUTPATIENT
Start: 2022-03-31

## 2022-03-31 RX ORDER — AMITRIPTYLINE HYDROCHLORIDE 100 MG/1
TABLET, FILM COATED ORAL
Qty: 90 TABLET | Refills: 3 | Status: SHIPPED | OUTPATIENT
Start: 2022-03-31

## 2022-03-31 RX ORDER — CHOLECALCIFEROL (VITAMIN D3) 125 MCG
1 CAPSULE ORAL AS NEEDED
COMMUNITY

## 2022-05-18 ENCOUNTER — OFFICE VISIT (OUTPATIENT)
Dept: INTERNAL MEDICINE CLINIC | Facility: CLINIC | Age: 32
End: 2022-05-18
Payer: COMMERCIAL

## 2022-05-18 VITALS
HEIGHT: 71 IN | SYSTOLIC BLOOD PRESSURE: 112 MMHG | WEIGHT: 197 LBS | TEMPERATURE: 99 F | BODY MASS INDEX: 27.58 KG/M2 | DIASTOLIC BLOOD PRESSURE: 86 MMHG | HEART RATE: 102 BPM | OXYGEN SATURATION: 98 %

## 2022-05-18 DIAGNOSIS — B35.1 ONYCHOMYCOSIS: ICD-10-CM

## 2022-05-18 DIAGNOSIS — Z00.00 ANNUAL PHYSICAL EXAM: Primary | ICD-10-CM

## 2022-05-18 PROCEDURE — 3079F DIAST BP 80-89 MM HG: CPT | Performed by: INTERNAL MEDICINE

## 2022-05-18 PROCEDURE — 3008F BODY MASS INDEX DOCD: CPT | Performed by: INTERNAL MEDICINE

## 2022-05-18 PROCEDURE — 99395 PREV VISIT EST AGE 18-39: CPT | Performed by: INTERNAL MEDICINE

## 2022-05-18 PROCEDURE — 3074F SYST BP LT 130 MM HG: CPT | Performed by: INTERNAL MEDICINE

## 2022-06-18 ENCOUNTER — LAB ENCOUNTER (OUTPATIENT)
Dept: LAB | Facility: HOSPITAL | Age: 32
End: 2022-06-18
Attending: INTERNAL MEDICINE
Payer: COMMERCIAL

## 2022-06-18 DIAGNOSIS — Z00.00 ANNUAL PHYSICAL EXAM: ICD-10-CM

## 2022-06-18 LAB
ALBUMIN SERPL-MCNC: 4.3 G/DL (ref 3.4–5)
ALBUMIN/GLOB SERPL: 1.3 {RATIO} (ref 1–2)
ALP LIVER SERPL-CCNC: 42 U/L
ALT SERPL-CCNC: 19 U/L
ANION GAP SERPL CALC-SCNC: 4 MMOL/L (ref 0–18)
AST SERPL-CCNC: 15 U/L (ref 15–37)
BASOPHILS # BLD AUTO: 0.02 X10(3) UL (ref 0–0.2)
BASOPHILS NFR BLD AUTO: 0.4 %
BILIRUB SERPL-MCNC: 1 MG/DL (ref 0.1–2)
BUN BLD-MCNC: 11 MG/DL (ref 7–18)
BUN/CREAT SERPL: 11 (ref 10–20)
CALCIUM BLD-MCNC: 9.1 MG/DL (ref 8.5–10.1)
CHLORIDE SERPL-SCNC: 108 MMOL/L (ref 98–112)
CHOLEST SERPL-MCNC: 162 MG/DL (ref ?–200)
CO2 SERPL-SCNC: 29 MMOL/L (ref 21–32)
CREAT BLD-MCNC: 1 MG/DL
DEPRECATED RDW RBC AUTO: 36 FL (ref 35.1–46.3)
EOSINOPHIL # BLD AUTO: 0.13 X10(3) UL (ref 0–0.7)
EOSINOPHIL NFR BLD AUTO: 2.6 %
ERYTHROCYTE [DISTWIDTH] IN BLOOD BY AUTOMATED COUNT: 11.9 % (ref 11–15)
FASTING PATIENT LIPID ANSWER: YES
FASTING STATUS PATIENT QL REPORTED: YES
GLOBULIN PLAS-MCNC: 3.2 G/DL (ref 2.8–4.4)
GLUCOSE BLD-MCNC: 89 MG/DL (ref 70–99)
HCT VFR BLD AUTO: 41.5 %
HDLC SERPL-MCNC: 49 MG/DL (ref 40–59)
HGB BLD-MCNC: 14.4 G/DL
IMM GRANULOCYTES # BLD AUTO: 0.01 X10(3) UL (ref 0–1)
IMM GRANULOCYTES NFR BLD: 0.2 %
LDLC SERPL CALC-MCNC: 95 MG/DL (ref ?–100)
LYMPHOCYTES # BLD AUTO: 1.93 X10(3) UL (ref 1–4)
LYMPHOCYTES NFR BLD AUTO: 38.8 %
MCH RBC QN AUTO: 29.2 PG (ref 26–34)
MCHC RBC AUTO-ENTMCNC: 34.7 G/DL (ref 31–37)
MCV RBC AUTO: 84.2 FL
MONOCYTES # BLD AUTO: 0.35 X10(3) UL (ref 0.1–1)
MONOCYTES NFR BLD AUTO: 7 %
NEUTROPHILS # BLD AUTO: 2.53 X10 (3) UL (ref 1.5–7.7)
NEUTROPHILS # BLD AUTO: 2.53 X10(3) UL (ref 1.5–7.7)
NEUTROPHILS NFR BLD AUTO: 51 %
NONHDLC SERPL-MCNC: 113 MG/DL (ref ?–130)
OSMOLALITY SERPL CALC.SUM OF ELEC: 291 MOSM/KG (ref 275–295)
PLATELET # BLD AUTO: 271 10(3)UL (ref 150–450)
POTASSIUM SERPL-SCNC: 4.2 MMOL/L (ref 3.5–5.1)
PROT SERPL-MCNC: 7.5 G/DL (ref 6.4–8.2)
RBC # BLD AUTO: 4.93 X10(6)UL
SODIUM SERPL-SCNC: 141 MMOL/L (ref 136–145)
TRIGL SERPL-MCNC: 95 MG/DL (ref 30–149)
TSI SER-ACNC: 1.74 MIU/ML (ref 0.36–3.74)
VLDLC SERPL CALC-MCNC: 16 MG/DL (ref 0–30)
WBC # BLD AUTO: 5 X10(3) UL (ref 4–11)

## 2022-06-18 PROCEDURE — 80061 LIPID PANEL: CPT

## 2022-06-18 PROCEDURE — 84443 ASSAY THYROID STIM HORMONE: CPT

## 2022-06-18 PROCEDURE — 85025 COMPLETE CBC W/AUTO DIFF WBC: CPT

## 2022-06-18 PROCEDURE — 36415 COLL VENOUS BLD VENIPUNCTURE: CPT

## 2022-06-18 PROCEDURE — 80053 COMPREHEN METABOLIC PANEL: CPT

## 2023-01-27 ENCOUNTER — TELEMEDICINE (OUTPATIENT)
Dept: INTERNAL MEDICINE CLINIC | Facility: CLINIC | Age: 33
End: 2023-01-27

## 2023-01-27 DIAGNOSIS — L30.8 OTHER ECZEMA: Primary | ICD-10-CM

## 2023-01-27 PROCEDURE — 99212 OFFICE O/P EST SF 10 MIN: CPT | Performed by: INTERNAL MEDICINE

## 2023-01-27 RX ORDER — CLOBETASOL PROPIONATE 0.5 MG/G
1 OINTMENT TOPICAL 2 TIMES DAILY
Qty: 30 G | Refills: 2 | Status: SHIPPED | OUTPATIENT
Start: 2023-01-27

## 2023-01-27 NOTE — PROGRESS NOTES
Virtual Telephone Check-In    Baltazar Thomas verbally consents to a Virtual/Telephone Check-In visit on 01/27/23. Patient has been referred to the [facility] website at [email or web address] to review the yearly Consent to Treat document. Patient understands and accepts financial responsibility for any deductible, co-insurance and/or co-pays associated with this service. Duration of the service: 10 minutes; video utilized      Summary of topics discussed: rash  S:    Noticed a rash--near L shoulder. When scratching, it starts bleeding. Changed to a different bar soap about 2-3 months ago. O:   Erythematous, dry patchy area along base of left side of neck.      P:  Eczema  -clobetasol bid x 7-10 days  -avoid long hot showers  -moisturize using aquaphor twice daily  -call if not better in a week                  Sin Gold, DO

## 2023-02-22 ENCOUNTER — PATIENT MESSAGE (OUTPATIENT)
Dept: NEUROLOGY | Facility: CLINIC | Age: 33
End: 2023-02-22

## 2023-02-22 DIAGNOSIS — G44.029 CHRONIC CLUSTER HEADACHE, NOT INTRACTABLE: ICD-10-CM

## 2023-02-22 RX ORDER — AMITRIPTYLINE HYDROCHLORIDE 100 MG/1
TABLET, FILM COATED ORAL
Qty: 30 TABLET | Refills: 0 | Status: SHIPPED | OUTPATIENT
Start: 2023-02-22

## 2023-02-22 NOTE — TELEPHONE ENCOUNTER
From: Shaneka Magallanes  To: Maggi Brady MD  Sent: 2/22/2023 12:20 PM CST  Subject: Prescription Refill for New Patient    Good morning,    I was a patient of Dr. Carley Doran (based in [facility] in [facility]) but she retired last year which is why I've scheduled a new patient visit with you on March 7, which was the earliest available a couple months ago. I will run out of one of my medications (Amitriptyline 100mg, 1x per day) a few days prior to the appointment. This was a prescription from Dr. Pierre Valenzuela. Would it be possible to get a refill of a few days worth prior to my appointment on March 7?     Jose Lopez

## 2023-03-07 ENCOUNTER — PATIENT MESSAGE (OUTPATIENT)
Dept: NEUROLOGY | Facility: CLINIC | Age: 33
End: 2023-03-07

## 2023-03-07 ENCOUNTER — OFFICE VISIT (OUTPATIENT)
Dept: NEUROLOGY | Facility: CLINIC | Age: 33
End: 2023-03-07
Payer: COMMERCIAL

## 2023-03-07 VITALS — WEIGHT: 206 LBS | BODY MASS INDEX: 28.84 KG/M2 | HEIGHT: 71 IN

## 2023-03-07 DIAGNOSIS — R20.2 TINGLING IN EXTREMITIES: ICD-10-CM

## 2023-03-07 DIAGNOSIS — G43.109 COMPLICATED MIGRAINE: ICD-10-CM

## 2023-03-07 DIAGNOSIS — G44.029 CHRONIC CLUSTER HEADACHE, NOT INTRACTABLE: ICD-10-CM

## 2023-03-07 DIAGNOSIS — R51.9 CHRONIC NONINTRACTABLE HEADACHE, UNSPECIFIED HEADACHE TYPE: Primary | ICD-10-CM

## 2023-03-07 DIAGNOSIS — G89.29 CHRONIC NONINTRACTABLE HEADACHE, UNSPECIFIED HEADACHE TYPE: Primary | ICD-10-CM

## 2023-03-07 DIAGNOSIS — M54.6 ACUTE MIDLINE THORACIC BACK PAIN: ICD-10-CM

## 2023-03-07 PROCEDURE — 99213 OFFICE O/P EST LOW 20 MIN: CPT | Performed by: OTHER

## 2023-03-07 PROCEDURE — 3008F BODY MASS INDEX DOCD: CPT | Performed by: OTHER

## 2023-03-07 RX ORDER — GABAPENTIN 600 MG/1
600 TABLET ORAL 3 TIMES DAILY
Qty: 30 TABLET | Refills: 5 | Status: SHIPPED | OUTPATIENT
Start: 2023-03-07 | End: 2023-03-08

## 2023-03-07 RX ORDER — AMITRIPTYLINE HYDROCHLORIDE 100 MG/1
TABLET, FILM COATED ORAL
Qty: 30 TABLET | Refills: 5 | Status: SHIPPED | OUTPATIENT
Start: 2023-03-07

## 2023-03-07 NOTE — TELEPHONE ENCOUNTER
From: Maricarmen King  To: Angela Manuel MD  Sent: 3/7/2023 4:30 PM CST  Subject: [facility] Prescription Refill Issue    Good afternoon,    Looking at what [facility] provided me, it looks they processed the prescription refill as 30 600mg Gabapentin pills. However, it should be three 600mg gabapentin pills per day (so 90 pills in total). The prescription listing in [address] is correct so it looks like it's an issue from their end? I've attached a screenshot. Is this something you're able to poke them on?     Villa Pronto

## 2023-03-08 RX ORDER — GABAPENTIN 600 MG/1
600 TABLET ORAL 3 TIMES DAILY
Qty: 90 TABLET | Refills: 0 | Status: SHIPPED | OUTPATIENT
Start: 2023-03-08

## 2023-06-11 ENCOUNTER — PATIENT MESSAGE (OUTPATIENT)
Dept: INTERNAL MEDICINE CLINIC | Facility: CLINIC | Age: 33
End: 2023-06-11

## 2023-06-12 NOTE — TELEPHONE ENCOUNTER
From: Tahira Hernandez  To: Geovanna Bess, DO  Sent: 6/11/2023 12:59 PM CDT  Subject: Large spot on back and smaller spots on shoulders    Hi Dr. Kyara Chandler,    I'm calling tomorrow at 8:00 to schedule the earliest available appointment ASAP (to get everything checked out before my wedding and honeymoon next month) but I wanted to share these photos. The shoulder and neckline spots popped up in the past two weeks while the back spot in this size and shape is new to me as of yesterday. Not sure when that would have appeared. I had used the steroid cream you provided back in January on the initial noman that was there (and it largely went away, but leaves something almost like a scar or dry spot). Hoping to know what these are (particularly about the large spot) to see what I need to do.     Leora Sanchez ([phone])

## 2023-06-14 ENCOUNTER — OFFICE VISIT (OUTPATIENT)
Dept: DERMATOLOGY CLINIC | Facility: CLINIC | Age: 33
End: 2023-06-14

## 2023-06-14 ENCOUNTER — OFFICE VISIT (OUTPATIENT)
Dept: INTERNAL MEDICINE CLINIC | Facility: CLINIC | Age: 33
End: 2023-06-14
Payer: COMMERCIAL

## 2023-06-14 ENCOUNTER — LAB ENCOUNTER (OUTPATIENT)
Dept: LAB | Age: 33
End: 2023-06-14
Attending: INTERNAL MEDICINE
Payer: COMMERCIAL

## 2023-06-14 VITALS
WEIGHT: 212 LBS | SYSTOLIC BLOOD PRESSURE: 106 MMHG | TEMPERATURE: 99 F | HEART RATE: 80 BPM | BODY MASS INDEX: 29.68 KG/M2 | DIASTOLIC BLOOD PRESSURE: 78 MMHG | HEIGHT: 71 IN

## 2023-06-14 DIAGNOSIS — L64.9 ANDROGENETIC ALOPECIA: ICD-10-CM

## 2023-06-14 DIAGNOSIS — L30.9 ECZEMA, UNSPECIFIED TYPE: ICD-10-CM

## 2023-06-14 DIAGNOSIS — R21 RASH: ICD-10-CM

## 2023-06-14 DIAGNOSIS — G43.809 OTHER MIGRAINE WITHOUT STATUS MIGRAINOSUS, NOT INTRACTABLE: ICD-10-CM

## 2023-06-14 DIAGNOSIS — Z00.00 ANNUAL PHYSICAL EXAM: ICD-10-CM

## 2023-06-14 DIAGNOSIS — L65.0 TELOGEN EFFLUVIUM: ICD-10-CM

## 2023-06-14 DIAGNOSIS — R21 RASH AND NONSPECIFIC SKIN ERUPTION: Primary | ICD-10-CM

## 2023-06-14 DIAGNOSIS — L30.8 OTHER ECZEMA: ICD-10-CM

## 2023-06-14 DIAGNOSIS — Z00.00 ANNUAL PHYSICAL EXAM: Primary | ICD-10-CM

## 2023-06-14 LAB
ALBUMIN SERPL-MCNC: 4.4 G/DL (ref 3.4–5)
ALBUMIN/GLOB SERPL: 1.4 {RATIO} (ref 1–2)
ALP LIVER SERPL-CCNC: 52 U/L
ALT SERPL-CCNC: 47 U/L
ANION GAP SERPL CALC-SCNC: 9 MMOL/L (ref 0–18)
AST SERPL-CCNC: 22 U/L (ref 15–37)
BASOPHILS # BLD AUTO: 0.02 X10(3) UL (ref 0–0.2)
BASOPHILS NFR BLD AUTO: 0.4 %
BILIRUB SERPL-MCNC: 1.4 MG/DL (ref 0.1–2)
BUN BLD-MCNC: 8 MG/DL (ref 7–18)
BUN/CREAT SERPL: 7.5 (ref 10–20)
CALCIUM BLD-MCNC: 9.6 MG/DL (ref 8.5–10.1)
CHLORIDE SERPL-SCNC: 108 MMOL/L (ref 98–112)
CHOLEST SERPL-MCNC: 170 MG/DL (ref ?–200)
CO2 SERPL-SCNC: 24 MMOL/L (ref 21–32)
CREAT BLD-MCNC: 1.06 MG/DL
DEPRECATED RDW RBC AUTO: 36.8 FL (ref 35.1–46.3)
EOSINOPHIL # BLD AUTO: 0.14 X10(3) UL (ref 0–0.7)
EOSINOPHIL NFR BLD AUTO: 2.9 %
ERYTHROCYTE [DISTWIDTH] IN BLOOD BY AUTOMATED COUNT: 12.1 % (ref 11–15)
FASTING PATIENT LIPID ANSWER: YES
FASTING STATUS PATIENT QL REPORTED: YES
GFR SERPLBLD BASED ON 1.73 SQ M-ARVRAT: 96 ML/MIN/1.73M2 (ref 60–?)
GLOBULIN PLAS-MCNC: 3.1 G/DL (ref 2.8–4.4)
GLUCOSE BLD-MCNC: 99 MG/DL (ref 70–99)
HCT VFR BLD AUTO: 44.2 %
HDLC SERPL-MCNC: 46 MG/DL (ref 40–59)
HGB BLD-MCNC: 15.2 G/DL
IMM GRANULOCYTES # BLD AUTO: 0.01 X10(3) UL (ref 0–1)
IMM GRANULOCYTES NFR BLD: 0.2 %
LDLC SERPL CALC-MCNC: 105 MG/DL (ref ?–100)
LYMPHOCYTES # BLD AUTO: 2.27 X10(3) UL (ref 1–4)
LYMPHOCYTES NFR BLD AUTO: 47.4 %
MCH RBC QN AUTO: 29 PG (ref 26–34)
MCHC RBC AUTO-ENTMCNC: 34.4 G/DL (ref 31–37)
MCV RBC AUTO: 84.2 FL
MONOCYTES # BLD AUTO: 0.43 X10(3) UL (ref 0.1–1)
MONOCYTES NFR BLD AUTO: 9 %
NEUTROPHILS # BLD AUTO: 1.92 X10 (3) UL (ref 1.5–7.7)
NEUTROPHILS # BLD AUTO: 1.92 X10(3) UL (ref 1.5–7.7)
NEUTROPHILS NFR BLD AUTO: 40.1 %
NONHDLC SERPL-MCNC: 124 MG/DL (ref ?–130)
OSMOLALITY SERPL CALC.SUM OF ELEC: 290 MOSM/KG (ref 275–295)
PLATELET # BLD AUTO: 305 10(3)UL (ref 150–450)
POTASSIUM SERPL-SCNC: 4.5 MMOL/L (ref 3.5–5.1)
PROT SERPL-MCNC: 7.5 G/DL (ref 6.4–8.2)
RBC # BLD AUTO: 5.25 X10(6)UL
SODIUM SERPL-SCNC: 141 MMOL/L (ref 136–145)
TRIGL SERPL-MCNC: 107 MG/DL (ref 30–149)
TSI SER-ACNC: 3.14 MIU/ML (ref 0.36–3.74)
VLDLC SERPL CALC-MCNC: 18 MG/DL (ref 0–30)
WBC # BLD AUTO: 4.8 X10(3) UL (ref 4–11)

## 2023-06-14 PROCEDURE — 80053 COMPREHEN METABOLIC PANEL: CPT

## 2023-06-14 PROCEDURE — 99395 PREV VISIT EST AGE 18-39: CPT | Performed by: INTERNAL MEDICINE

## 2023-06-14 PROCEDURE — 84443 ASSAY THYROID STIM HORMONE: CPT

## 2023-06-14 PROCEDURE — 3074F SYST BP LT 130 MM HG: CPT | Performed by: INTERNAL MEDICINE

## 2023-06-14 PROCEDURE — 99204 OFFICE O/P NEW MOD 45 MIN: CPT | Performed by: STUDENT IN AN ORGANIZED HEALTH CARE EDUCATION/TRAINING PROGRAM

## 2023-06-14 PROCEDURE — 3008F BODY MASS INDEX DOCD: CPT | Performed by: INTERNAL MEDICINE

## 2023-06-14 PROCEDURE — 85025 COMPLETE CBC W/AUTO DIFF WBC: CPT

## 2023-06-14 PROCEDURE — 3078F DIAST BP <80 MM HG: CPT | Performed by: INTERNAL MEDICINE

## 2023-06-14 PROCEDURE — 36415 COLL VENOUS BLD VENIPUNCTURE: CPT

## 2023-06-14 PROCEDURE — 80061 LIPID PANEL: CPT

## 2023-06-14 RX ORDER — MINOXIDIL 2.5 MG/1
2.5 TABLET ORAL DAILY
Qty: 180 TABLET | Refills: 0 | Status: SHIPPED | OUTPATIENT
Start: 2023-06-14

## 2023-06-14 RX ORDER — NYSTATIN AND TRIAMCINOLONE ACETONIDE 100000; 1 [USP'U]/G; MG/G
OINTMENT TOPICAL
Qty: 60 G | Refills: 3 | Status: SHIPPED | OUTPATIENT
Start: 2023-06-14

## 2023-06-14 RX ORDER — KETOCONAZOLE 20 MG/ML
SHAMPOO TOPICAL
Qty: 120 ML | Refills: 12 | Status: SHIPPED | OUTPATIENT
Start: 2023-06-14

## 2023-06-15 ENCOUNTER — TELEPHONE (OUTPATIENT)
Dept: INTERNAL MEDICINE CLINIC | Facility: CLINIC | Age: 33
End: 2023-06-15

## 2023-06-15 NOTE — TELEPHONE ENCOUNTER
These are related---i'm not worried yet, but as we discussed in the ov, can cut down on carbs and this can help

## 2023-06-15 NOTE — TELEPHONE ENCOUNTER
Spoke to pt and relayed MD message and instructions. Pt verbalized understanding and agrees with plan. To Dr Gabi Taylor --     Pt asking about trend in fasting glucose over the last 2 years, states it appears to be trending up, asking if there is anything he should be doIng differently. Triglycerides are also trending up. Component      Latest Ref Rng 5/27/2021 6/18/2022 6/14/2023   Glucose      70 - 99 mg/dL 77  89  99    Sodium      136 - 145 mmol/L  141  141    Potassium      3.5 - 5.1 mmol/L  4.2  4.5    Chloride      98 - 112 mmol/L  108  108    Carbon Dioxide, Total      21.0 - 32.0 mmol/L  29.0  24.0    ANION GAP      0 - 18 mmol/L  4  9    BUN      7 - 18 mg/dL  11  8    CREATININE      0.70 - 1.30 mg/dL  1.00  1.06    BUN/CREATININE RATIO      10.0 - 20.0   11.0  7.5 (L)    CALCIUM      8.5 - 10.1 mg/dL  9.1  9.6    CALCULATED OSMOLALITY      275 - 295 mOsm/kg  291  290    eGFR NON-AFR. AMERICAN      >=60   100     eGFR       >=60   115     ALT (SGPT)      16 - 61 U/L  19  47    AST (SGOT)      15 - 37 U/L  15  22    ALKALINE PHOSPHATASE      45 - 117 U/L  42 (L)  52    Total Bilirubin      0.1 - 2.0 mg/dL  1.0  1.4    PROTEIN, TOTAL      6.4 - 8.2 g/dL  7.5  7.5    Albumin      3.4 - 5.0 g/dL  4.3  4.4    Globulin      2.8 - 4.4 g/dL  3.2  3.1    A/G Ratio      1.0 - 2.0   1.3  1.4    Patient Fasting for CMP? Yes  Yes    eGFR-Cr      >=60 mL/min/1.73m2   96    Cholesterol, Total      <200 mg/dL 173  162  170    HDL Cholesterol      40 - 59 mg/dL 47  49  46    Triglycerides      30 - 149 mg/dL 65  95  107    LDL Cholesterol Calc      <100 mg/dL 113 (H)  95  105 (H)    VLDL      0 - 30 mg/dL 13  16  18    NON-HDL CHOLESTEROL      <130 mg/dL 126  113  124    Patient Fasting? Yes      Patient Fasting? Yes      Patient Fasting for Lipid?   Yes  Yes

## 2023-06-15 NOTE — TELEPHONE ENCOUNTER
----- Message from Marquis Ministerio DO sent at 6/15/2023  7:03 AM CDT -----  Please notdy of normal blood work

## 2023-08-18 DIAGNOSIS — G44.029 CHRONIC CLUSTER HEADACHE, NOT INTRACTABLE: ICD-10-CM

## 2023-08-18 DIAGNOSIS — M54.6 ACUTE MIDLINE THORACIC BACK PAIN: ICD-10-CM

## 2023-08-18 DIAGNOSIS — R20.2 TINGLING IN EXTREMITIES: ICD-10-CM

## 2023-08-18 DIAGNOSIS — G89.29 CHRONIC NONINTRACTABLE HEADACHE, UNSPECIFIED HEADACHE TYPE: ICD-10-CM

## 2023-08-18 DIAGNOSIS — G43.109 COMPLICATED MIGRAINE: ICD-10-CM

## 2023-08-18 DIAGNOSIS — R51.9 CHRONIC NONINTRACTABLE HEADACHE, UNSPECIFIED HEADACHE TYPE: ICD-10-CM

## 2023-08-21 RX ORDER — GABAPENTIN 600 MG/1
600 TABLET ORAL 3 TIMES DAILY
Qty: 90 TABLET | Refills: 5 | Status: SHIPPED | OUTPATIENT
Start: 2023-08-21

## 2023-08-21 NOTE — TELEPHONE ENCOUNTER
Requested Prescriptions     Pending Prescriptions Disp Refills    gabapentin 600 MG Oral Tab 90 tablet 4     Sig: Take 1 tablet (600 mg total) by mouth 3 (three) times daily.         LOV:3/7/23  Return in about 1 year (around 3/7/2024)   NOV: none    [place]23/[place] (QTY 90/4RF)

## 2023-09-10 ENCOUNTER — PATIENT MESSAGE (OUTPATIENT)
Dept: DERMATOLOGY CLINIC | Facility: CLINIC | Age: 33
End: 2023-09-10

## 2023-09-11 NOTE — TELEPHONE ENCOUNTER
LOV 6/14/23 - Dr. Patrice Jimenez - please see message from pt. It looks like the clobetasol was supposed to be use for flares or scaling. Pt may continue with the Ketoconazole shampoo, correct? Thank you.

## 2023-09-11 NOTE — TELEPHONE ENCOUNTER
From: Gema Pierre  To: Som Carson MD  Sent: 9/10/2023 10:43 PM CDT  Subject: Duration of medication usage     Hi Dr. Madelyn Yin,    Do you know how long I should continue to use my cream and medicated shampoo? I do not see any spots or discoloration any longer but I'm not sure if they would return if I discontinued.     Brayden Pinzon

## 2023-09-14 DIAGNOSIS — G44.029 CHRONIC CLUSTER HEADACHE, NOT INTRACTABLE: ICD-10-CM

## 2023-09-14 NOTE — TELEPHONE ENCOUNTER
Requested Prescriptions     Pending Prescriptions Disp Refills    Amitriptyline HCl 100 MG Oral Tab 30 tablet 5     Sig: TAKE 1 TABLET(100 MG) BY MOUTH EVERY NIGHT        LOV: 3/7/23  NOV: none    [place] 3[place]23

## 2023-09-15 RX ORDER — AMITRIPTYLINE HYDROCHLORIDE 100 MG/1
TABLET ORAL
Qty: 30 TABLET | Refills: 5 | Status: SHIPPED | OUTPATIENT
Start: 2023-09-15

## 2023-10-31 ENCOUNTER — OFFICE VISIT (OUTPATIENT)
Dept: SURGERY | Facility: CLINIC | Age: 33
End: 2023-10-31

## 2023-10-31 VITALS — DIASTOLIC BLOOD PRESSURE: 79 MMHG | SYSTOLIC BLOOD PRESSURE: 118 MMHG | HEART RATE: 82 BPM

## 2023-10-31 DIAGNOSIS — R30.0 DYSURIA: ICD-10-CM

## 2023-10-31 DIAGNOSIS — N52.8 OTHER MALE ERECTILE DYSFUNCTION: Primary | ICD-10-CM

## 2023-10-31 DIAGNOSIS — R68.82 LOW LIBIDO: ICD-10-CM

## 2023-10-31 PROCEDURE — 99244 OFF/OP CNSLTJ NEW/EST MOD 40: CPT | Performed by: UROLOGY

## 2023-10-31 PROCEDURE — 3078F DIAST BP <80 MM HG: CPT | Performed by: UROLOGY

## 2023-10-31 PROCEDURE — 3074F SYST BP LT 130 MM HG: CPT | Performed by: UROLOGY

## 2023-10-31 RX ORDER — TADALAFIL 5 MG/1
TABLET ORAL
COMMUNITY
Start: 2023-08-01 | End: 2023-10-31

## 2023-10-31 RX ORDER — SILDENAFIL 50 MG/1
50 TABLET, FILM COATED ORAL
Qty: 6 TABLET | Refills: 5 | Status: SHIPPED | OUTPATIENT
Start: 2023-10-31

## 2023-11-04 ENCOUNTER — LAB ENCOUNTER (OUTPATIENT)
Dept: LAB | Facility: HOSPITAL | Age: 33
End: 2023-11-04
Attending: UROLOGY
Payer: COMMERCIAL

## 2023-11-04 DIAGNOSIS — R68.82 LOW LIBIDO: ICD-10-CM

## 2023-11-04 DIAGNOSIS — R30.0 DYSURIA: ICD-10-CM

## 2023-11-04 LAB
BILIRUB UR QL: NEGATIVE
CLARITY UR: CLEAR
COLOR UR: YELLOW
GLUCOSE UR-MCNC: NORMAL MG/DL
HGB UR QL STRIP.AUTO: NEGATIVE
KETONES UR-MCNC: NEGATIVE MG/DL
LEUKOCYTE ESTERASE UR QL STRIP.AUTO: NEGATIVE
NITRITE UR QL STRIP.AUTO: NEGATIVE
PH UR: 5.5 [PH] (ref 5–8)
PROT UR-MCNC: 20 MG/DL
SP GR UR STRIP: >1.03 (ref 1–1.03)
UROBILINOGEN UR STRIP-ACNC: NORMAL

## 2023-11-04 PROCEDURE — 81001 URINALYSIS AUTO W/SCOPE: CPT

## 2023-11-04 PROCEDURE — 84410 TESTOSTERONE BIOAVAILABLE: CPT

## 2023-11-04 PROCEDURE — 36415 COLL VENOUS BLD VENIPUNCTURE: CPT

## 2023-11-09 ENCOUNTER — PATIENT MESSAGE (OUTPATIENT)
Dept: SURGERY | Facility: CLINIC | Age: 33
End: 2023-11-09

## 2023-11-09 LAB
SEX HORM BIND GLOB: 16.4 NMOL/L
TESTOST % FREE+WEAK BND: 33.3 %
TESTOST FREE+WEAK BND: 101.7 NG/DL
TESTOSTERONE TOT /MS: 305.5 NG/DL

## 2023-11-22 ENCOUNTER — PATIENT MESSAGE (OUTPATIENT)
Dept: NEUROLOGY | Facility: CLINIC | Age: 33
End: 2023-11-22

## 2023-11-22 NOTE — TELEPHONE ENCOUNTER
From: Riya Young  To: Christophe Lyn  Sent: 11/22/2023 9:15 AM CST  Subject: Amitriptyline Usage     Hi Dr. Cecy Gray,    I got  recently and one thing I'd like to understand is whether Amitriptyline can be adversely impacting my libido as it has been generally low. If so, can we try reducing my dosage? I'm open to feedback and I have an appointment scheduled for next month.     Chris Barksdale

## 2023-11-27 ENCOUNTER — OFFICE VISIT (OUTPATIENT)
Dept: SURGERY | Facility: CLINIC | Age: 33
End: 2023-11-27
Payer: COMMERCIAL

## 2023-11-27 DIAGNOSIS — N52.8 OTHER MALE ERECTILE DYSFUNCTION: Primary | ICD-10-CM

## 2023-11-27 DIAGNOSIS — R68.82 LOW LIBIDO: ICD-10-CM

## 2023-11-27 PROCEDURE — 99213 OFFICE O/P EST LOW 20 MIN: CPT | Performed by: UROLOGY

## 2023-11-27 NOTE — PROGRESS NOTES
Tahira Hernandez is a 29year old male. HPI:     Chief Complaint   Patient presents with    Follow - Up     PT here for follow up visit. 36-year-old male with a history of erectile dysfunction and follow-up to visit October 31, 2023. He was prescribed sildenafil 50 mg tablets. Free and total testosterone and urinalysis results were both unremarkable. I reviewed those results with the patient today. He states he had moderately good results but again sometimes loses the erection prior to completion. He does not drink alcohol and states he took the medication on an empty stomach.       HISTORY:  Past Medical History:   Diagnosis Date    History of abdominal pain     Insomnia       Past Surgical History:   Procedure Laterality Date    COLONOSCOPY      EGD N/A 4/14/2017    Procedure: ESOPHAGOGASTRODUODENOSCOPY, COLONOSCOPY, POSSIBLE BIOPSY, POSSIBLE POLYPECTOMY 31612, 92277;  Surgeon: Rashida Mobley MD;  Location: [address] [place] [address]      Family History   Problem Relation Age of Onset    Hypertension Maternal Grandmother     Asthma Maternal Grandmother     Cancer Maternal Grandmother         Skin cancer    Cancer Maternal Grandfather         Throat cancer    Anemia Father     Heart Disorder Father         Bicuspid aortic valve    Hypertension Father     Pulmonary Disease Father         Scleroderma causing interstitial lung disease    Anemia Mother     Heart Disorder Paternal Grandfather         Subacute myocarditis    Hypertension Paternal Grandmother       Social History:   Social History     Socioeconomic History    Marital status:    Tobacco Use    Smoking status: Never    Smokeless tobacco: Never   Vaping Use    Vaping Use: Never used   Substance and Sexual Activity    Alcohol use: No     Alcohol/week: 0.0 standard drinks of alcohol    Drug use: No   Other Topics Concern    Caffeine Concern Yes     Comment: daily    Exercise No    Reaction to local anesthetic No    Pt has a pacemaker No    Pt has a defibrillator No        Medications (Active prior to today's visit):  Current Outpatient Medications   Medication Sig Dispense Refill    Sildenafil Citrate 50 MG Oral Tab Take 1 tablet (50 mg total) by mouth daily as needed for Erectile Dysfunction. 6 tablet 5    Amitriptyline HCl 100 MG Oral Tab TAKE 1 TABLET(100 MG) BY MOUTH EVERY NIGHT 30 tablet 5    gabapentin 600 MG Oral Tab Take 1 tablet (600 mg total) by mouth 3 (three) times daily. 90 tablet 5    minoxidil 2.5 MG Oral Tab Take 1 tablet (2.5 mg total) by mouth daily. 180 tablet 0    nystatin-triamcinolone 100,000-0.1 Units/g-% External Ointment Apply twice daily  Monday-Friday to affected areas of rash in armpits . 60 g 3    ketoconazole 2 % External Shampoo His body wash on chest and back 3 times weekly. Lather onto skin and leave on for several minutes before washing off. 120 mL 12    clobetasol 0.05 % External Ointment Apply 1 Application. topically 2 (two) times daily. 30 g 2    Ciclopirox 8 % External Solution Apply 1 Application topically nightly. 1 each 3    Melatonin 5 MG Oral Tab Take 1 tablet (5 mg total) by mouth as needed. acetaminophen 500 MG Oral Tab Take 1 tablet (500 mg total) by mouth every 6 (six) hours as needed for Pain. (Patient not taking: Reported on 6/14/2023)         Allergies:  No Known Allergies      ROS:       PHYSICAL EXAM:        ASSESSMENT/PLAN:   Assessment   Encounter Diagnoses   Name Primary? Other male erectile dysfunction Yes    Low libido        Recommend:  - Asked the patient to increase the dose from sildenafil 50 mg to 100 mg.  - If more effective he will call the office to change his prescription to 100 mg tablets. - Otherwise follow-up in 3 to 4 months. Orders This Visit:  No orders of the defined types were placed in this encounter.       Meds This Visit:  Requested Prescriptions      No prescriptions requested or ordered in this encounter       Imaging & Referrals:  None     11/27/2023  [facility] Nic Agrawal MD

## 2023-11-29 NOTE — TELEPHONE ENCOUNTER
MD KEIRA Szymanski [id number] hours ago (8:02 PM)       He can reduce the dose to 50 mg nightly and see if that alleviates side effect.  Thanks

## 2023-12-14 ENCOUNTER — OFFICE VISIT (OUTPATIENT)
Dept: DERMATOLOGY CLINIC | Facility: CLINIC | Age: 33
End: 2023-12-14
Payer: COMMERCIAL

## 2023-12-14 DIAGNOSIS — L65.0 TELOGEN EFFLUVIUM: Primary | ICD-10-CM

## 2023-12-14 DIAGNOSIS — L64.9 ANDROGENETIC ALOPECIA: ICD-10-CM

## 2023-12-14 DIAGNOSIS — L25.9 CONTACT DERMATITIS, UNSPECIFIED CONTACT DERMATITIS TYPE, UNSPECIFIED TRIGGER: ICD-10-CM

## 2023-12-14 PROCEDURE — 99213 OFFICE O/P EST LOW 20 MIN: CPT | Performed by: STUDENT IN AN ORGANIZED HEALTH CARE EDUCATION/TRAINING PROGRAM

## 2023-12-14 NOTE — PROGRESS NOTES
December 14, 2023    Established Patient    HISTORY OF PRESENT ILLNESS: Jhony Segal is a 29year old male here for F/U to conditions     Contact dermatitis  Location: Bilateral Axilla  Duration: > 6 months  Condition Update: Improved with using treatment, but flares a few days after stopping  Current treatment: triamcinolone- nystatin BID with flares    2. Telogen effluvium/Androgenetic alopecia  Location: Scalp  Duration: Unknown  Condition Update: No change  Current treatment: oral minoxidil 2.5 mg daily    3. Toenail Fungus  Location: Bilateral Feet Toenails (several)  Duration: 1.5 years  Condition Update: Fungus has improved with treatment. Pt would like to know if this is a chronic problem with need for maintenance medication. Current Treatment: Ciclopirox 8 % External Solution       Personal Dermatologic History  History of chronic skin disease: No    Family History  History of chronic skin disease: No  History autoimmune diseases:  No    Past Medical History  Past Medical History:   Diagnosis Date    History of abdominal pain     Insomnia        REVIEW OF SYSTEMS:  Constitutional: Denies fever, chills, unintentional weight loss. Skin as per HPI    Medications  Current Outpatient Medications   Medication Sig Dispense Refill    Sildenafil Citrate 50 MG Oral Tab Take 1 tablet (50 mg total) by mouth daily as needed for Erectile Dysfunction. 6 tablet 5    Amitriptyline HCl 100 MG Oral Tab TAKE 1 TABLET(100 MG) BY MOUTH EVERY NIGHT 30 tablet 5    gabapentin 600 MG Oral Tab Take 1 tablet (600 mg total) by mouth 3 (three) times daily. 90 tablet 5    minoxidil 2.5 MG Oral Tab Take 1 tablet (2.5 mg total) by mouth daily. 180 tablet 0    nystatin-triamcinolone 100,000-0.1 Units/g-% External Ointment Apply twice daily  Monday-Friday to affected areas of rash in armpits . 60 g 3    ketoconazole 2 % External Shampoo His body wash on chest and back 3 times weekly.   Lather onto skin and leave on for several minutes before washing off. 120 mL 12    clobetasol 0.05 % External Ointment Apply 1 Application. topically 2 (two) times daily. 30 g 2    Ciclopirox 8 % External Solution Apply 1 Application topically nightly. 1 each 3    Melatonin 5 MG Oral Tab Take 1 tablet (5 mg total) by mouth as needed. acetaminophen 500 MG Oral Tab Take 1 tablet (500 mg total) by mouth every 6 (six) hours as needed for Pain. (Patient not taking: Reported on 6/14/2023)         PHYSICAL EXAM:  General: awake, alert, no acute distress  Skin: Skin exam was performed today including the following: Right neck, back, scalp, exhilarated. Pertinent findings include:   -Axillae with hyperpigmented lichenified plaques  -Scalp with generalized thinning  - Right upper chest and lower back with well-defined hyperpigmented macules    ASSESSMENT & PLAN:  Pathophysiology of diagnoses discussed with patient. Therapeutic options reviewed. Risks, benefits, and alternatives discussed with patient. Instructions reviewed at length. #Rash and nonspecific skin eruption, resolved  - Continue to monitor    #Contact dermatitis vs psoriasiform dermatitis, axilae  - Continue triamcinolone-nystatin and twice daily with flares    #Telogen effluvium  #Androgenetic alopecia  - Continue oral minoxidil 2.5 mg daily (half a tablet) with food  - Discussed medication usage, dosage, risks, benefits and side effects. ADR discusssed, including, but not limited to:  hypotension, tachycardia, pericardial effusion, edema, and increased facial/body hirsutism.       Return to clinic:  6 months  or sooner if something concerning arises    Fadi Savage MD

## 2023-12-19 ENCOUNTER — OFFICE VISIT (OUTPATIENT)
Dept: NEUROLOGY | Facility: CLINIC | Age: 33
End: 2023-12-19
Payer: COMMERCIAL

## 2023-12-19 VITALS — HEART RATE: 84 BPM | DIASTOLIC BLOOD PRESSURE: 91 MMHG | OXYGEN SATURATION: 99 % | SYSTOLIC BLOOD PRESSURE: 130 MMHG

## 2023-12-19 DIAGNOSIS — G89.29 CHRONIC NONINTRACTABLE HEADACHE, UNSPECIFIED HEADACHE TYPE: Primary | ICD-10-CM

## 2023-12-19 DIAGNOSIS — R51.9 CHRONIC NONINTRACTABLE HEADACHE, UNSPECIFIED HEADACHE TYPE: Primary | ICD-10-CM

## 2023-12-19 PROCEDURE — 3075F SYST BP GE 130 - 139MM HG: CPT | Performed by: OTHER

## 2023-12-19 PROCEDURE — 99213 OFFICE O/P EST LOW 20 MIN: CPT | Performed by: OTHER

## 2023-12-19 PROCEDURE — 3080F DIAST BP >= 90 MM HG: CPT | Performed by: OTHER

## 2023-12-19 NOTE — PROGRESS NOTES
HPI:    Patient ID: Gema Pierre is a 29year old male. Headache          Caitlin Daily is a pleasant 29year old male who presents for chronic headache follow up. States headaches are stable intermittently still gets a dull headache bandlike but manageable. She was on amitriptyline 100 mg along with gabapentin 600 mg thrice daily  He has decrease libido and saw Urology who recommended weaning amitriptyline down and he is now on 50 mg states there has been no change in his headaches. Testosterone levels are normal.   States he sleeps fine but still feel tired and not well rested. + snoring but no pauses in breathing      Gema Pierre is a(n) 28year old, right handed,  male who presents for multiple symptoms, I saw him first time on 11/22/2016. With multiple pain symptoms, I did full work up including MRI brain, whole spine, labs, nothing showed, I suspected that he has atypical complicated migraine, I started him with elavil, gradually up to 100 mg qhs, he seems slightly better, He was last seen on 4/2/2021,  he has been stable, doing well, on same dose of medications,  he  Is on elavil  100 mg qhs, Neurontin 600 mg tid, it definitely helped him, all of those symptoms are less frequent , once every few months,  he is able to carry on his daily routine and work, he has no focal weakness, chronic fatigue or incontinence, he tolerated elavil dose, he remains to have same symptoms, less intense, he sleeps better,  work up is negative in celiac and porphyria, he remains to have  mild L. Sided HA 1-2/10,  Less frequent,   He went to [facility], had full neurological and GI work up there, again everything was negative, he was told he has sensitization. he was put him on Neurontin, from 600 mg tid,   Return back to follow up with local neurologist, he feels last year he has been doing well on those two medication, no side effect, he is happy with it, he was promoted at work.  He changed job nov 2021 since then he feels less stress, sleep is better. HISTORY:  Past Medical History:   Diagnosis Date    History of abdominal pain     Insomnia       Past Surgical History:   Procedure Laterality Date    COLONOSCOPY      EGD N/A 4/14/2017    Procedure: ESOPHAGOGASTRODUODENOSCOPY, COLONOSCOPY, POSSIBLE BIOPSY, POSSIBLE POLYPECTOMY 30640, [id number];  Surgeon: Matt Burk MD;  Location: [address] [place] [address]      Family History   Problem Relation Age of Onset    Hypertension Maternal Grandmother     Asthma Maternal Grandmother     Cancer Maternal Grandmother         Skin cancer    Cancer Maternal Grandfather         Throat cancer    Anemia Father     Heart Disorder Father         Bicuspid aortic valve    Hypertension Father     Pulmonary Disease Father         Scleroderma causing interstitial lung disease    Anemia Mother     Heart Disorder Paternal Grandfather         Subacute myocarditis    Hypertension Paternal Grandmother       Social History     Socioeconomic History    Marital status:    Tobacco Use    Smoking status: Never    Smokeless tobacco: Never   Vaping Use    Vaping Use: Never used   Substance and Sexual Activity    Alcohol use: No     Alcohol/week: 0.0 standard drinks of alcohol    Drug use: No   Other Topics Concern    Caffeine Concern Yes     Comment: daily    Exercise No    Reaction to local anesthetic No    Pt has a pacemaker No    Pt has a defibrillator No        Review of Systems   Constitutional: Negative. HENT: Negative. Eyes: Negative. Respiratory: Negative. Cardiovascular: Negative. Gastrointestinal: Negative. Endocrine: Negative. Genitourinary: Negative. Musculoskeletal: Negative. Skin: Negative. Allergic/Immunologic: Negative. Neurological:  Positive for headaches. Hematological: Negative. Psychiatric/Behavioral: Negative. All other systems reviewed and are negative.            Current Outpatient Medications   Medication Sig Dispense Refill    Multiple Vitamin (MULTIVITAMIN ADULT OR) Take 1 tablet by mouth daily. Sildenafil Citrate 50 MG Oral Tab Take 1 tablet (50 mg total) by mouth daily as needed for Erectile Dysfunction. 6 tablet 5    Amitriptyline HCl 100 MG Oral Tab TAKE 1 TABLET(100 MG) BY MOUTH EVERY NIGHT (Patient taking differently: Take 0.5 tablets (50 mg total) by mouth nightly. TAKE 1 TABLET(100 MG) BY MOUTH EVERY NIGHT) 30 tablet 5    gabapentin 600 MG Oral Tab Take 1 tablet (600 mg total) by mouth 3 (three) times daily. 90 tablet 5    minoxidil 2.5 MG Oral Tab Take 1 tablet (2.5 mg total) by mouth daily. 180 tablet 0    nystatin-triamcinolone 100,000-0.1 Units/g-% External Ointment Apply twice daily  Monday-Friday to affected areas of rash in armpits . 60 g 3    ketoconazole 2 % External Shampoo His body wash on chest and back 3 times weekly. Lather onto skin and leave on for several minutes before washing off. 120 mL 12    Ciclopirox 8 % External Solution Apply 1 Application topically nightly. 1 each 3    Melatonin 5 MG Oral Tab Take 1 tablet (5 mg total) by mouth as needed. acetaminophen 500 MG Oral Tab Take 1 tablet (500 mg total) by mouth every 6 (six) hours as needed for Pain. clobetasol 0.05 % External Ointment Apply 1 Application. topically 2 (two) times daily. (Patient not taking: Reported on 12/14/2023) 30 g 2     Allergies:No Known Allergies  PHYSICAL EXAM:   Physical Exam   Blood pressure (!) 130/91, pulse 84, SpO2 99%. General Appearance: Well nourished, well developed, no apparent distress. HEENT: Normocephalic and atraumatic. Cardiovascular: Normal rate, regular rhythm and normal heart sounds. Pulmonary/Chest: Effort normal and breath sounds normal.   Abdominal: Soft. Bowel sounds are normal.   Musculoskeletal: no joint tenderness or redness    Neurological:  Patient is awake, alert and oriented to person, place and time  Normal memory,  Attention/concentration, speech and language.     Cranial Nerves:     II: Visual fields: normal  III: Pupils: equal, round, reactive to light  III,IV,VI: Extra Ocular Movements: intact  V: Facial sensation: intact  VII: Facial strength: intact  VIII: Hearing: intact  IX: Palate: intact  XI: Shoulder shrug: intact  XII: Tongue movement: normal    Motor: Normal tone. Strength is  5 out of 5 in all extremities bilaterally. Sensory: Sensory examination is normal to light touch and pinprick     Coordination: Finger-to-nose normal bilaterally without evidence of dysmetria. Gait: normal casual gait       ASSESSMENT/PLAN:       ICD-10-CM    1. Chronic nonintractable headache, unspecified headache type  R51.9     G89.29                 Headaches stable. Continue gabapentin 600 mg 3 times a day    Due to suspected sexual dysfunction SE from Amitriptyline, Urology recommended to wean off amitriptyline    If headache worsen may consider another agent    Consider Sleep study given daytime fatigue      Follow up in about 6 months    See orders and medications filed with this encounter. The patient indicates understanding of these issues and agrees with the plan.         Tapan Manrique MD  [facility] [phone]      Meds This Visit:  Requested Prescriptions      No prescriptions requested or ordered in this encounter       Imaging & Referrals:  None     #[id number]

## 2023-12-20 NOTE — TELEPHONE ENCOUNTER
Refill Request for medication(s):   Minoxidil    Last Office Visit: 12/14/23    Last Refill: 06/14/23    Pharmacy, Dosage verified:     Condition Update (if applicable):     Rx pended and sent to provider for approval, please advise. Thank You!

## 2023-12-21 RX ORDER — MINOXIDIL 2.5 MG/1
2.5 TABLET ORAL DAILY
Qty: 180 TABLET | Refills: 0 | Status: SHIPPED | OUTPATIENT
Start: 2023-12-21

## 2024-02-08 DIAGNOSIS — M54.6 ACUTE MIDLINE THORACIC BACK PAIN: ICD-10-CM

## 2024-02-08 DIAGNOSIS — G89.29 CHRONIC NONINTRACTABLE HEADACHE, UNSPECIFIED HEADACHE TYPE: ICD-10-CM

## 2024-02-08 DIAGNOSIS — G44.029 CHRONIC CLUSTER HEADACHE, NOT INTRACTABLE: ICD-10-CM

## 2024-02-08 DIAGNOSIS — G43.109 COMPLICATED MIGRAINE: ICD-10-CM

## 2024-02-08 DIAGNOSIS — R20.2 TINGLING IN EXTREMITIES: ICD-10-CM

## 2024-02-08 DIAGNOSIS — R51.9 CHRONIC NONINTRACTABLE HEADACHE, UNSPECIFIED HEADACHE TYPE: ICD-10-CM

## 2024-02-09 RX ORDER — GABAPENTIN 600 MG/1
600 TABLET ORAL 3 TIMES DAILY
Qty: 90 TABLET | Refills: 5 | Status: SHIPPED | OUTPATIENT
Start: 2024-02-09

## 2024-02-09 NOTE — TELEPHONE ENCOUNTER
Requested Prescriptions     Pending Prescriptions Disp Refills    gabapentin 600 MG Oral Tab 90 tablet 5     Sig: Take 1 tablet (600 mg total) by mouth 3 (three) times daily.      LOV: 12/19/23  NOV: 6/25/24    Last refill/[place]: 8/21/23 (QTY 90/5RF)

## 2024-02-23 ENCOUNTER — OFFICE VISIT (OUTPATIENT)
Dept: SURGERY | Facility: CLINIC | Age: 34
End: 2024-02-23
Payer: COMMERCIAL

## 2024-02-23 DIAGNOSIS — N52.8 OTHER MALE ERECTILE DYSFUNCTION: Primary | ICD-10-CM

## 2024-02-23 PROCEDURE — 99213 OFFICE O/P EST LOW 20 MIN: CPT | Performed by: UROLOGY

## 2024-02-23 RX ORDER — SILDENAFIL 100 MG/1
100 TABLET, FILM COATED ORAL
Qty: 5 TABLET | Refills: 5 | Status: SHIPPED | OUTPATIENT
Start: 2024-02-23

## 2024-05-03 DIAGNOSIS — G44.029 CHRONIC CLUSTER HEADACHE, NOT INTRACTABLE: ICD-10-CM

## 2024-05-06 RX ORDER — AMITRIPTYLINE HYDROCHLORIDE 100 MG/1
50 TABLET ORAL NIGHTLY
Qty: 30 TABLET | Refills: 0 | Status: SHIPPED | OUTPATIENT
Start: 2024-05-06

## 2024-05-06 NOTE — TELEPHONE ENCOUNTER
Per providers recommends reducing the dose of your amitriptyline to 50 mg nightly to see if that alleviates the side effect.11/29/2023 Rx has been adjusted pending providers approval.    Medication Quantity Refills Start End   Amitriptyline HCl 100 MG Oral Tab 30 tablet 5 9/15/2023 --   Sig:   TAKE 1 TABLET(100 MG) BY MOUTH EVERY NIGHT     Patient taking differently:   Take 0.5 tablets (50 mg total) by mouth nightly. TAKE 1 TABLET(100 MG) BY MOUTH EVERY NIGHT     Route:   (none)     Order #:   [id number]           LOV:12/19/2023  NOV: 06/25/2024    Last refill/[place]: 12/19/2023

## 2024-05-07 RX ORDER — CICLOPIROX 80 MG/ML
SOLUTION TOPICAL
Qty: 1 EACH | Refills: 3 | Status: SHIPPED | OUTPATIENT
Start: 2024-05-07

## 2024-05-07 NOTE — TELEPHONE ENCOUNTER
Refill request is for a maintenance medication and has met the criteria specified in the Ambulatory Medication Refill Standing Order for eligibility, visits, laboratory, alerts and was sent to the requested pharmacy.    Requested Prescriptions     Signed Prescriptions Disp Refills    Ciclopirox 8 % External Solution 1 each 3     Sig: APPLY 1 APPLICATION TOPICALLY TO THE AFFECTED AREA EVERY NIGHT     Authorizing Provider: ROMAN ZHOU     Ordering User: JONATHAN FRY

## 2024-05-09 ENCOUNTER — TELEPHONE (OUTPATIENT)
Dept: SLEEP CENTER | Age: 34
End: 2024-05-09

## 2024-05-13 ENCOUNTER — TELEPHONE (OUTPATIENT)
Dept: INTERNAL MEDICINE CLINIC | Facility: CLINIC | Age: 34
End: 2024-05-13

## 2024-05-13 ENCOUNTER — OFFICE VISIT (OUTPATIENT)
Dept: INTERNAL MEDICINE CLINIC | Facility: CLINIC | Age: 34
End: 2024-05-13

## 2024-05-13 VITALS
BODY MASS INDEX: 30.66 KG/M2 | SYSTOLIC BLOOD PRESSURE: 110 MMHG | WEIGHT: 219 LBS | DIASTOLIC BLOOD PRESSURE: 68 MMHG | OXYGEN SATURATION: 99 % | HEART RATE: 81 BPM | HEIGHT: 71 IN | TEMPERATURE: 99 F | RESPIRATION RATE: 16 BRPM

## 2024-05-13 DIAGNOSIS — Z00.00 ANNUAL PHYSICAL EXAM: Primary | ICD-10-CM

## 2024-05-13 DIAGNOSIS — G47.10 HYPERSOMNOLENCE: Primary | ICD-10-CM

## 2024-05-13 DIAGNOSIS — R06.81 APNEA: ICD-10-CM

## 2024-05-13 DIAGNOSIS — R06.83 SNORING: ICD-10-CM

## 2024-05-13 PROCEDURE — 3008F BODY MASS INDEX DOCD: CPT | Performed by: INTERNAL MEDICINE

## 2024-05-13 PROCEDURE — 3078F DIAST BP <80 MM HG: CPT | Performed by: INTERNAL MEDICINE

## 2024-05-13 PROCEDURE — 99214 OFFICE O/P EST MOD 30 MIN: CPT | Performed by: INTERNAL MEDICINE

## 2024-05-13 PROCEDURE — 3074F SYST BP LT 130 MM HG: CPT | Performed by: INTERNAL MEDICINE

## 2024-05-13 NOTE — PROGRESS NOTES
Domi Chacon is a 33 year old male.  Chief Complaint   Patient presents with    Checkup     COMPLAINT OF feeling fatigued also reports heavy snoring by wife.       HPI:   Domi Chacon is a 33 year old male who presents for: fatigue    Has been more fatigued over several years.   Has been told he snores for many years by friends and now his wife.   Wife has noted that snoring is worse. Alos noted to have apneic episodes    Is requiring more and more caffeine to stay awake during work day. Used to drink coffee, but now taking energy drinks.     Wt Readings from Last 6 Encounters:   05/13/24 219 lb (99.3 kg)   06/14/23 212 lb (96.2 kg)   03/07/23 206 lb (93.4 kg)   05/18/22 197 lb (89.4 kg)   03/31/22 193 lb (87.5 kg)   05/20/21 202 lb (91.6 kg)     Body mass index is 30.54 kg/m².       Current Outpatient Medications   Medication Sig Dispense Refill    Ciclopirox 8 % External Solution APPLY 1 APPLICATION TOPICALLY TO THE AFFECTED AREA EVERY NIGHT 1 each 3    Amitriptyline HCl 100 MG Oral Tab Take 0.5 tablets (50 mg total) by mouth nightly. TAKE 1 TABLET(50 MG) BY MOUTH EVERY NIGHT 30 tablet 0    Sildenafil Citrate 100 MG Oral Tab Take 1 tablet (100 mg total) by mouth daily as needed for Erectile Dysfunction. 5 tablet 5    gabapentin 600 MG Oral Tab Take 1 tablet (600 mg total) by mouth 3 (three) times daily. 90 tablet 5    minoxidil 2.5 MG Oral Tab Take 1 tablet (2.5 mg total) by mouth daily. 180 tablet 0    Multiple Vitamin (MULTIVITAMIN ADULT OR) Take 1 tablet by mouth daily.      ketoconazole 2 % External Shampoo His body wash on chest and back 3 times weekly.  Lather onto skin and leave on for several minutes before washing off. 120 mL 12    acetaminophen 500 MG Oral Tab Take 1 tablet (500 mg total) by mouth every 6 (six) hours as needed for Pain.      nystatin-triamcinolone 100,000-0.1 Units/g-% External Ointment Apply twice daily  Monday-Friday to affected areas of rash in armpits . (Patient not taking:  Reported on 5/13/2024) 60 g 3    clobetasol 0.05 % External Ointment Apply 1 Application. topically 2 (two) times daily. (Patient not taking: Reported on 12/14/2023) 30 g 2    Melatonin 5 MG Oral Tab Take 1 tablet (5 mg total) by mouth as needed. (Patient not taking: Reported on 5/13/2024)        Past Medical History:    History of abdominal pain    Insomnia      Past Surgical History:   Procedure Laterality Date    Colonoscopy      Egd N/A 4/14/2017    Procedure: ESOPHAGOGASTRODUODENOSCOPY, COLONOSCOPY, POSSIBLE BIOPSY, POSSIBLE POLYPECTOMY 71077, [id number];  Surgeon: Stan Grant MD;  Location: [facility]      Family History   Problem Relation Age of Onset    Hypertension Maternal Grandmother     Asthma Maternal Grandmother     Cancer Maternal Grandmother         Skin cancer    Cancer Maternal Grandfather         Throat cancer    Anemia Father     Heart Disorder Father         Bicuspid aortic valve    Hypertension Father     Pulmonary Disease Father         Scleroderma causing interstitial lung disease    Anemia Mother     Heart Disorder Paternal Grandfather         Subacute myocarditis    Hypertension Paternal Grandmother       Social History:   Social History     Socioeconomic History    Marital status:    Tobacco Use    Smoking status: Never    Smokeless tobacco: Never   Vaping Use    Vaping status: Never Used   Substance and Sexual Activity    Alcohol use: No     Alcohol/week: 0.0 standard drinks of alcohol    Drug use: No   Other Topics Concern    Caffeine Concern Yes     Comment: daily    Exercise No    Reaction to local anesthetic No    Pt has a pacemaker No    Pt has a defibrillator No          REVIEW OF SYSTEMS:   GENERAL: feels well otherwise  +snoring, apnea      EXAM:   /68   Pulse 81   Temp 98.6 °F (37 °C) (Oral)   Resp 16   Ht 5' 11\" (1.803 m)   Wt 219 lb (99.3 kg)   SpO2 99%   BMI 30.54 kg/m²     GENERAL: well developed, well nourished, in no apparent  distress      ASSESSMENT AND PLAN:     1. Hypersomnolence  Advised sleep study to evaluate this further  - Home Sleep Apnea Test (Adult pt only) - Sleep consult required for Medicare pts  - General sleep study; Future    2. Snoring  Advised sleep study to evaluate this further  - Home Sleep Apnea Test (Adult pt only) - Sleep consult required for Medicare pts  - General sleep study; Future    3. Apnea  Advised sleep study to evaluate further  - Home Sleep Apnea Test (Adult pt only) - Sleep consult required for Medicare pts  - General sleep study; Future        Yolanda Salvador DO  5/13/2024  11:07 AM

## 2024-05-18 ENCOUNTER — LAB ENCOUNTER (OUTPATIENT)
Dept: LAB | Facility: HOSPITAL | Age: 34
End: 2024-05-18
Attending: INTERNAL MEDICINE

## 2024-05-18 DIAGNOSIS — Z00.00 ANNUAL PHYSICAL EXAM: ICD-10-CM

## 2024-05-18 LAB
ALBUMIN SERPL-MCNC: 4.7 G/DL (ref 3.2–4.8)
ALBUMIN/GLOB SERPL: 2 {RATIO} (ref 1–2)
ALP LIVER SERPL-CCNC: 48 U/L
ALT SERPL-CCNC: 41 U/L
ANION GAP SERPL CALC-SCNC: 5 MMOL/L (ref 0–18)
AST SERPL-CCNC: 22 U/L (ref ?–34)
BASOPHILS # BLD AUTO: 0.03 X10(3) UL (ref 0–0.2)
BASOPHILS NFR BLD AUTO: 0.6 %
BILIRUB SERPL-MCNC: 1.6 MG/DL (ref 0.3–1.2)
BUN BLD-MCNC: 11 MG/DL (ref 9–23)
BUN/CREAT SERPL: 11.1 (ref 10–20)
CALCIUM BLD-MCNC: 9.3 MG/DL (ref 8.7–10.4)
CHLORIDE SERPL-SCNC: 111 MMOL/L (ref 98–112)
CHOLEST SERPL-MCNC: 160 MG/DL (ref ?–200)
CO2 SERPL-SCNC: 26 MMOL/L (ref 21–32)
CREAT BLD-MCNC: 0.99 MG/DL
DEPRECATED RDW RBC AUTO: 36.6 FL (ref 35.1–46.3)
EGFRCR SERPLBLD CKD-EPI 2021: 104 ML/MIN/1.73M2 (ref 60–?)
EOSINOPHIL # BLD AUTO: 0.19 X10(3) UL (ref 0–0.7)
EOSINOPHIL NFR BLD AUTO: 3.6 %
ERYTHROCYTE [DISTWIDTH] IN BLOOD BY AUTOMATED COUNT: 12.2 % (ref 11–15)
FASTING PATIENT LIPID ANSWER: YES
FASTING STATUS PATIENT QL REPORTED: YES
GLOBULIN PLAS-MCNC: 2.4 G/DL (ref 2–3.5)
GLUCOSE BLD-MCNC: 83 MG/DL (ref 70–99)
HCT VFR BLD AUTO: 41 %
HDLC SERPL-MCNC: 41 MG/DL (ref 40–59)
HGB BLD-MCNC: 14.3 G/DL
IMM GRANULOCYTES # BLD AUTO: 0.01 X10(3) UL (ref 0–1)
IMM GRANULOCYTES NFR BLD: 0.2 %
LDLC SERPL CALC-MCNC: 101 MG/DL (ref ?–100)
LYMPHOCYTES # BLD AUTO: 2.1 X10(3) UL (ref 1–4)
LYMPHOCYTES NFR BLD AUTO: 39.7 %
MCH RBC QN AUTO: 28.9 PG (ref 26–34)
MCHC RBC AUTO-ENTMCNC: 34.9 G/DL (ref 31–37)
MCV RBC AUTO: 82.8 FL
MONOCYTES # BLD AUTO: 0.4 X10(3) UL (ref 0.1–1)
MONOCYTES NFR BLD AUTO: 7.6 %
NEUTROPHILS # BLD AUTO: 2.56 X10 (3) UL (ref 1.5–7.7)
NEUTROPHILS # BLD AUTO: 2.56 X10(3) UL (ref 1.5–7.7)
NEUTROPHILS NFR BLD AUTO: 48.3 %
NONHDLC SERPL-MCNC: 119 MG/DL (ref ?–130)
OSMOLALITY SERPL CALC.SUM OF ELEC: 293 MOSM/KG (ref 275–295)
PLATELET # BLD AUTO: 305 10(3)UL (ref 150–450)
POTASSIUM SERPL-SCNC: 4.1 MMOL/L (ref 3.5–5.1)
PROT SERPL-MCNC: 7.1 G/DL (ref 5.7–8.2)
RBC # BLD AUTO: 4.95 X10(6)UL
SODIUM SERPL-SCNC: 142 MMOL/L (ref 136–145)
TRIGL SERPL-MCNC: 99 MG/DL (ref 30–149)
TSI SER-ACNC: 2.08 MIU/ML (ref 0.55–4.78)
VLDLC SERPL CALC-MCNC: 16 MG/DL (ref 0–30)
WBC # BLD AUTO: 5.3 X10(3) UL (ref 4–11)

## 2024-05-18 PROCEDURE — 80061 LIPID PANEL: CPT

## 2024-05-18 PROCEDURE — 84443 ASSAY THYROID STIM HORMONE: CPT

## 2024-05-18 PROCEDURE — 85025 COMPLETE CBC W/AUTO DIFF WBC: CPT

## 2024-05-18 PROCEDURE — 36415 COLL VENOUS BLD VENIPUNCTURE: CPT

## 2024-05-18 PROCEDURE — 80053 COMPREHEN METABOLIC PANEL: CPT

## 2024-06-06 ENCOUNTER — OFFICE VISIT (OUTPATIENT)
Dept: SLEEP CENTER | Age: 34
End: 2024-06-06
Attending: INTERNAL MEDICINE
Payer: COMMERCIAL

## 2024-06-06 DIAGNOSIS — G47.10 HYPERSOMNOLENCE: ICD-10-CM

## 2024-06-06 DIAGNOSIS — R06.83 SNORING: ICD-10-CM

## 2024-06-06 DIAGNOSIS — R06.81 APNEA: ICD-10-CM

## 2024-06-06 PROCEDURE — 95806 SLEEP STUDY UNATT&RESP EFFT: CPT

## 2024-06-11 ENCOUNTER — TELEPHONE (OUTPATIENT)
Dept: INTERNAL MEDICINE CLINIC | Facility: CLINIC | Age: 34
End: 2024-06-11

## 2024-06-12 NOTE — TELEPHONE ENCOUNTER
Please notify pt that sleep study demonstrated mild DOUGIE--can either consider oral device or a CPAP device  Either way-would consider seeing pulmonologist to discuss these options and which might work best for you  --Dr. Donahue, Dr Schaefer, Dr. Scott

## 2024-06-18 ENCOUNTER — OFFICE VISIT (OUTPATIENT)
Dept: DERMATOLOGY CLINIC | Facility: CLINIC | Age: 34
End: 2024-06-18

## 2024-06-18 DIAGNOSIS — L65.0 TELOGEN EFFLUVIUM: Primary | ICD-10-CM

## 2024-06-18 DIAGNOSIS — R21 RASH AND NONSPECIFIC SKIN ERUPTION: ICD-10-CM

## 2024-06-18 DIAGNOSIS — L25.9 CONTACT DERMATITIS, UNSPECIFIED CONTACT DERMATITIS TYPE, UNSPECIFIED TRIGGER: ICD-10-CM

## 2024-06-18 DIAGNOSIS — L64.9 ANDROGENETIC ALOPECIA: ICD-10-CM

## 2024-06-18 PROCEDURE — 99213 OFFICE O/P EST LOW 20 MIN: CPT | Performed by: STUDENT IN AN ORGANIZED HEALTH CARE EDUCATION/TRAINING PROGRAM

## 2024-06-18 NOTE — PROGRESS NOTES
June 18, 2024    Established patient     CHIEF COMPLAINT: Condition (s) F/U    HISTORY OF PRESENT ILLNESS: .    1. Contact Dermatitis   Location: B/L Axilla   Duration: Months   Signs and symptoms: Notes Improvement  Current treatment: Triamcinolone, Nystatin BID w/ flares  Past treatments: Same     2.  Telogen effluvium/Androgenetic alopecia   Location: Scalp  Duration: N/A  Signs and symptoms: Notes no improvement   Current treatment: Minoxidil 2.5mg  Past treatments: Minoxidil 2.5mg    3.  Toenail Fungus   Location: B/L Feet Toenails   Duration: 1/5 Years   Signs and symptoms: Notes improvement  Current treatment: Ciclopirox 8% Solution   Past treatments: Same     DERM HISTORY:  History of chronic skin disease/condition: No    FAMILY HISTORY:  History of chronic skin disease/condition: No    History/Other:    REVIEW OF SYSTEMS:  Constitutional: Denies fever, chills, unintentional weight loss.   Skin as per HPI    PAST MEDICAL HISTORY:  Past Medical History:    History of abdominal pain    Insomnia       Medications  Current Outpatient Medications   Medication Sig Dispense Refill    Ciclopirox 8 % External Solution APPLY 1 APPLICATION TOPICALLY TO THE AFFECTED AREA EVERY NIGHT 1 each 3    Amitriptyline HCl 100 MG Oral Tab Take 0.5 tablets (50 mg total) by mouth nightly. TAKE 1 TABLET(50 MG) BY MOUTH EVERY NIGHT 30 tablet 0    Sildenafil Citrate 100 MG Oral Tab Take 1 tablet (100 mg total) by mouth daily as needed for Erectile Dysfunction. 5 tablet 5    gabapentin 600 MG Oral Tab Take 1 tablet (600 mg total) by mouth 3 (three) times daily. 90 tablet 5    minoxidil 2.5 MG Oral Tab Take 1 tablet (2.5 mg total) by mouth daily. 180 tablet 0    Multiple Vitamin (MULTIVITAMIN ADULT OR) Take 1 tablet by mouth daily.      nystatin-triamcinolone 100,000-0.1 Units/g-% External Ointment Apply twice daily  Monday-Friday to affected areas of rash in armpits . (Patient not taking: Reported on 5/13/2024) 60 g 3    ketoconazole 2  % External Shampoo His body wash on chest and back 3 times weekly.  Lather onto skin and leave on for several minutes before washing off. 120 mL 12    clobetasol 0.05 % External Ointment Apply 1 Application. topically 2 (two) times daily. (Patient not taking: Reported on 12/14/2023) 30 g 2    Melatonin 5 MG Oral Tab Take 1 tablet (5 mg total) by mouth as needed. (Patient not taking: Reported on 5/13/2024)      acetaminophen 500 MG Oral Tab Take 1 tablet (500 mg total) by mouth every 6 (six) hours as needed for Pain.         Objective:    PHYSICAL EXAM:  General: awake, alert, no acute distress  Skin: Skin exam was performed today including the following: trunk, scalp and feet. Pertinent findings include:   - toenails clear  - scalp stable   - trunk and extremities clear    ASSESSMENT & PLAN:  Pathophysiology of diagnoses discussed with patient.  Therapeutic options reviewed. Risks, benefits, and alternatives discussed with patient. Instructions reviewed at length.    #Onychomycosis, resolved  - Ciclopirox 3 times weekly     #Contact dermatitis vs psoriasiform dermatitis, axilae  - Continue triamcinolone-nystatin and twice daily with flares in future  - Continue vanicream deodorant     #Telogen effluvium  #Androgenetic alopecia  - Continue oral minoxidil 2.5 mg daily (half a tablet) with food  - Discussed medication usage, dosage, risks, benefits and side effects.  ADR discusssed, including, but not limited to:  hypotension, tachycardia, pericardial effusion, edema, and increased facial/body hirsutism.         Return to clinic: 1 year or sooner if something concerning arises     Torito Bhatt MD

## 2024-06-25 ENCOUNTER — OFFICE VISIT (OUTPATIENT)
Dept: NEUROLOGY | Facility: CLINIC | Age: 34
End: 2024-06-25

## 2024-06-25 VITALS — WEIGHT: 220 LBS | BODY MASS INDEX: 30.8 KG/M2 | HEIGHT: 71 IN

## 2024-06-25 DIAGNOSIS — R51.9 CHRONIC NONINTRACTABLE HEADACHE, UNSPECIFIED HEADACHE TYPE: Primary | ICD-10-CM

## 2024-06-25 DIAGNOSIS — G89.29 CHRONIC NONINTRACTABLE HEADACHE, UNSPECIFIED HEADACHE TYPE: Primary | ICD-10-CM

## 2024-06-25 PROCEDURE — 99214 OFFICE O/P EST MOD 30 MIN: CPT | Performed by: OTHER

## 2024-06-25 PROCEDURE — 3008F BODY MASS INDEX DOCD: CPT | Performed by: OTHER

## 2024-06-25 RX ORDER — DIVALPROEX SODIUM 250 MG/1
250 TABLET, EXTENDED RELEASE ORAL NIGHTLY
Qty: 30 TABLET | Refills: 2 | Status: SHIPPED | OUTPATIENT
Start: 2024-06-25

## 2024-06-25 RX ORDER — AMITRIPTYLINE HYDROCHLORIDE 10 MG/1
20 TABLET, FILM COATED ORAL NIGHTLY
Qty: 60 TABLET | Refills: 0 | Status: SHIPPED | OUTPATIENT
Start: 2024-06-25

## 2024-06-25 NOTE — PROGRESS NOTES
HPI:    Patient ID: Domi Chacon is a 34 year old male.    Headache   This is a chronic problem. The problem occurs intermittently. The problem has been unchanged. The pain is located in the Frontal region. The pain does not radiate. The pain quality is similar to prior headaches. The quality of the pain is described as dull and band-like. The pain is mild. He has tried antidepressants for the symptoms.      Patient states headaches have increase due to being wean off Amitriptyline because of sexual dysfunction  Currently down to 30-40 mg Amitriptyline. No change in the headaches  Sleep study still pending. No new complaints      James is a pleasant 33 year old male who presents for chronic headache follow up.  States headaches are stable intermittently still gets a dull headache bandlike but manageable.  She was on amitriptyline 100 mg along with gabapentin 600 mg thrice daily  He has decrease libido and saw Urology who recommended weaning amitriptyline down and he is now on 50 mg states there has been no change in his headaches.  Testosterone levels are normal.   States he sleeps fine but still feel tired and not well rested. + snoring but no pauses in breathing      Domi Chacon is a(n) 31 year old, right handed,  male who presents for multiple symptoms, I saw him first time on 11/22/2016. 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 Sided HA 1-2/10,  Less frequent,   He went to [facility], had full neurological and GI work up there, again everything was negative, he was told he has sensitization. he was put him on Neurontin, from 600 mg tid,   Return back to follow up with local neurologist, he feels last year he has been doing well on those two medication, no side effect, he is happy with it, he was promoted at work. He changed job nov 2021 since then he feels less stress, sleep is better.           HISTORY:  Past Medical History:    History of abdominal pain    Insomnia      Past Surgical History:   Procedure Laterality Date    Colonoscopy      Egd N/A 4/14/2017    Procedure: ESOPHAGOGASTRODUODENOSCOPY, COLONOSCOPY, POSSIBLE BIOPSY, POSSIBLE POLYPECTOMY 78504, [id number];  Surgeon: Stan Grant MD;  [place] [facility]      Family History   Problem Relation Age of Onset    Hypertension Maternal Grandmother     Asthma Maternal Grandmother     Cancer Maternal Grandmother         Skin cancer    Cancer Maternal Grandfather         Throat cancer    Anemia Father     Heart Disorder Father         Bicuspid aortic valve    Hypertension Father     Pulmonary Disease Father         Scleroderma causing interstitial lung disease    Anemia Mother     Heart Disorder Paternal Grandfather         Subacute myocarditis    Hypertension Paternal Grandmother       Social History     Socioeconomic History    Marital status:    Tobacco Use    Smoking status: Never    Smokeless tobacco: Never   Vaping Use    Vaping status: Never Used   Substance and Sexual Activity    Alcohol use: No     Alcohol/week: 0.0 standard drinks of alcohol    Drug use: No   Other Topics Concern    Caffeine Concern Yes     Comment: daily    Exercise No    Reaction to local anesthetic No    Pt has a pacemaker No    Pt has a defibrillator No         Review of Systems   Constitutional: Negative.    HENT: Negative.     Eyes: Negative.    Respiratory: Negative.     Cardiovascular: Negative.    Gastrointestinal: Negative.    Endocrine: Negative.    Genitourinary: Negative.    Musculoskeletal: Negative.    Skin: Negative.    Allergic/Immunologic: Negative.    Neurological:  Positive for headaches.   Hematological: Negative.    Psychiatric/Behavioral: Negative.     All other systems reviewed and are negative.           Current Outpatient Medications   Medication Sig Dispense Refill    Ciclopirox 8 % External Solution APPLY 1 APPLICATION TOPICALLY TO THE AFFECTED AREA EVERY NIGHT 1 each 3    Amitriptyline HCl 100 MG Oral Tab Take 0.5 tablets (50 mg total) by mouth nightly. TAKE 1 TABLET(50 MG) BY MOUTH EVERY NIGHT 30 tablet 0    Sildenafil Citrate 100 MG Oral Tab Take 1 tablet (100 mg total) by mouth daily as needed for Erectile Dysfunction. 5 tablet 5    gabapentin 600 MG Oral Tab Take 1 tablet (600 mg total) by mouth 3 (three) times daily. 90 tablet 5    minoxidil 2.5 MG Oral Tab Take 1 tablet (2.5 mg total) by mouth daily. 180 tablet 0    Multiple Vitamin (MULTIVITAMIN ADULT OR) Take 1 tablet by mouth daily.      nystatin-triamcinolone 100,000-0.1 Units/g-% External Ointment Apply twice daily  Monday-Friday to affected areas of rash in armpits . 60 g 3    Melatonin 5 MG Oral Tab Take 1 tablet (5 mg total) by mouth as needed.      acetaminophen 500 MG Oral Tab Take 1 tablet (500 mg total) by mouth every 6 (six) hours as needed for Pain.      ketoconazole 2 % External Shampoo His body wash on chest and back 3 times weekly.  Lather onto skin and leave on for several minutes before washing off. (Patient not taking: Reported on 6/18/2024) 120 mL 12    clobetasol 0.05 % External Ointment Apply 1 Application. topically 2 (two) times daily. (Patient not taking: Reported on 6/18/2024) 30 g 2     Allergies:No Known Allergies  PHYSICAL EXAM:   Physical Exam   Height 71\",  weight 220 lb (99.8 kg).      General Appearance: Well nourished, well developed, no apparent distress.   HEENT: Normocephalic and atraumatic.   Cardiovascular: Normal rate, regular rhythm and normal heart sounds.    Pulmonary/Chest: Effort normal and breath sounds normal.   Abdominal: Soft. Bowel sounds are normal.   Musculoskeletal: no joint tenderness or redness    Neurological:  Patient is awake, alert and oriented to person, place and time  Normal memory,  Attention/concentration, speech and language.    Cranial Nerves:     II: Visual fields: normal  III: Pupils: equal, round, reactive to light  III,IV,VI: Extra Ocular Movements: intact  V: Facial sensation: intact  VII: Facial strength: intact  VIII: Hearing: intact  IX: Palate: intact  XI: Shoulder shrug: intact  XII: Tongue movement: normal    Motor: Normal tone. Strength is  5 out of 5 in all extremities bilaterally.    Sensory: Sensory examination is normal to light touch and pinprick     Coordination: Finger-to-nose normal bilaterally without evidence of dysmetria.    Gait: normal casual gait       ASSESSMENT/PLAN:       ICD-10-CM    1. Chronic nonintractable headache, unspecified headache type  R51.9     G89.29                 Headaches slightly worse due to lower dose of Amitriptyline    Due to suspected sexual dysfunction SE from Amitriptyline, Urology recommended to wean off amitriptyline      Wean off Amitriptyline as instructed- 20 mg nightly x 1 week then 10 mg x 1 week then stop  Start Depakote  mg nightly     Continue gabapentin 600 mg 3 times a day      Consider Sleep study given daytime fatigue      Follow up in about 6 months    See orders and medications filed with this encounter. The patient indicates understanding of these issues and agrees with the plan.  Orders Placed This Encounter    divalproex  MG Oral Tablet 24 Hr     Sig: Take 1 tablet (250 mg total) by mouth at bedtime.     Dispense:  30 tablet     Refill:  2     amitriptyline 10 MG Oral Tab     Sig: Take 2 tablets (20 mg total) by mouth nightly.     Dispense:  60 tablet     Refill:  0          Christophe Lyn MD  [facility] [place]      [facility] This Visit:  Requested Prescriptions      No prescriptions requested or ordered in this encounter       Imaging & Referrals:  None     ID#[id number]

## 2024-08-02 DIAGNOSIS — G43.109 COMPLICATED MIGRAINE: ICD-10-CM

## 2024-08-02 DIAGNOSIS — M54.6 ACUTE MIDLINE THORACIC BACK PAIN: ICD-10-CM

## 2024-08-02 DIAGNOSIS — G89.29 CHRONIC NONINTRACTABLE HEADACHE, UNSPECIFIED HEADACHE TYPE: ICD-10-CM

## 2024-08-02 DIAGNOSIS — G44.029 CHRONIC CLUSTER HEADACHE, NOT INTRACTABLE: ICD-10-CM

## 2024-08-02 DIAGNOSIS — R51.9 CHRONIC NONINTRACTABLE HEADACHE, UNSPECIFIED HEADACHE TYPE: ICD-10-CM

## 2024-08-02 DIAGNOSIS — R20.2 TINGLING IN EXTREMITIES: ICD-10-CM

## 2024-08-02 RX ORDER — GABAPENTIN 600 MG/1
600 TABLET ORAL 3 TIMES DAILY
Qty: 90 TABLET | Refills: 5 | Status: SHIPPED | OUTPATIENT
Start: 2024-08-02

## 2024-08-02 NOTE — TELEPHONE ENCOUNTER
Requested Prescriptions     Pending Prescriptions Disp Refills    gabapentin 600 MG Oral Tab 90 tablet 5     Sig: Take 1 tablet (600 mg total) by mouth 3 (three) times daily.        Last OV: 6/25/2024 with a Return in about 6 months (around 12/25/2024).     Next OV:     [place]/;

## 2024-08-07 RX ORDER — MINOXIDIL 2.5 MG/1
2.5 TABLET ORAL DAILY
Qty: 180 TABLET | Refills: 0 | Status: SHIPPED | OUTPATIENT
Start: 2024-08-07

## 2024-08-07 NOTE — TELEPHONE ENCOUNTER
Refill Request for medication(s): minoxidil 2.5 MG Oral Tab     Last Office Visit: 6/2024    Last Refill:    Pharmacy, Dosage verified:     Condition Update (if applicable):     Rx pended and sent to provider for approval, please advise. Thank You!

## 2024-09-24 NOTE — TELEPHONE ENCOUNTER
Pt requesting a refill of VPA ER 250mg tablets.    Last office visit: 6/25/24    Next office visit: None scheduled.       Patient ID: Domi Chacon is a 34 year old male.     Headache   This is a chronic problem. The problem occurs intermittently. The problem has been unchanged. The pain is located in the Frontal region. The pain does not radiate. The pain quality is similar to prior headaches. The quality of the pain is described as dull and band-like. The pain is mild. He has tried antidepressants for the symptoms.      Patient states headaches have increase due to being wean off Amitriptyline because of sexual dysfunction  Currently down to 30-40 mg Amitriptyline. No change in the headaches  Sleep study still pending. No new complaints        James is a pleasant 33 year old male who presents for chronic headache follow up.  States headaches are stable intermittently still gets a dull headache bandlike but manageable.  She was on amitriptyline 100 mg along with gabapentin 600 mg thrice daily  He has decrease libido and saw Urology who recommended weaning amitriptyline down and he is now on 50 mg states there has been no change in his headaches.  Testosterone levels are normal.   States he sleeps fine but still feel tired and not well rested. + snoring but no pauses in breathing        Domi Chacon is a(n) 31 year old, right handed,  male who presents for multiple symptoms, I saw him first time on 11/22/2016. 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 Sided HA 1-2/10,  Less frequent,   He went to 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 he was put him on Neurontin, from 600 mg tid,   Return back to follow up with local neurologist, he feels last year he has been doing well on those two medication, no side effect, he is happy with it, he was promoted at work. He changed job nov 2021 since then he feels less stress, sleep is better.            HISTORY:  Past Medical History       Past Medical History:    History of abdominal pain    Insomnia         Past Surgical History         Past Surgical History:   Procedure Laterality Date    Colonoscopy        Egd N/A 4/14/2017     Procedure: ESOPHAGOGASTRODUODENOSCOPY, COLONOSCOPY, POSSIBLE BIOPSY, POSSIBLE POLYPECTOMY 85689, 86397;  Surgeon: Stan Grant MD;  [place] [facility]         Family History         Family History   Problem Relation Age of Onset    Hypertension Maternal Grandmother      Asthma Maternal Grandmother      Cancer Maternal Grandmother           Skin cancer    Cancer Maternal Grandfather           Throat cancer    Anemia Father      Heart Disorder Father           Bicuspid aortic valve    Hypertension Father      Pulmonary Disease Father           Scleroderma causing interstitial lung disease    Anemia Mother      Heart Disorder Paternal Grandfather           Subacute myocarditis    Hypertension Paternal Grandmother           Short Social Hx on File   Social History            Socioeconomic History    Marital status:    Tobacco Use    Smoking status: Never    Smokeless tobacco: Never   Vaping Use    Vaping status: Never Used   Substance and Sexual Activity     Alcohol use: No       Alcohol/week: 0.0 standard drinks of alcohol    Drug use: No   Other Topics Concern    Caffeine Concern Yes       Comment: daily    Exercise No    Reaction to local anesthetic No    Pt has a pacemaker No    Pt has a defibrillator No            Review of Systems   Constitutional: Negative.    HENT: Negative.     Eyes: Negative.    Respiratory: Negative.     Cardiovascular: Negative.    Gastrointestinal: Negative.    Endocrine: Negative.    Genitourinary: Negative.    Musculoskeletal: Negative.    Skin: Negative.    Allergic/Immunologic: Negative.    Neurological:  Positive for headaches.   Hematological: Negative.    Psychiatric/Behavioral: Negative.     All other systems reviewed and are negative.           Current Medications          Current Outpatient Medications   Medication Sig Dispense Refill    Ciclopirox 8 % External Solution APPLY 1 APPLICATION TOPICALLY TO THE AFFECTED AREA EVERY NIGHT 1 each 3    Amitriptyline HCl 100 MG Oral Tab Take 0.5 tablets (50 mg total) by mouth nightly. TAKE 1 TABLET(50 MG) BY MOUTH EVERY NIGHT 30 tablet 0    Sildenafil Citrate 100 MG Oral Tab Take 1 tablet (100 mg total) by mouth daily as needed for Erectile Dysfunction. 5 tablet 5    gabapentin 600 MG Oral Tab Take 1 tablet (600 mg total) by mouth 3 (three) times daily. 90 tablet 5    minoxidil 2.5 MG Oral Tab Take 1 tablet (2.5 mg total) by mouth daily. 180 tablet 0    Multiple Vitamin (MULTIVITAMIN ADULT OR) Take 1 tablet by mouth daily.        nystatin-triamcinolone 100,000-0.1 Units/g-% External Ointment Apply twice daily  Monday-Friday to affected areas of rash in armpits . 60 g 3    Melatonin 5 MG Oral Tab Take 1 tablet (5 mg total) by mouth as needed.        acetaminophen 500 MG Oral Tab Take 1 tablet (500 mg total) by mouth every 6 (six) hours as needed for Pain.        ketoconazole 2 % External Shampoo His body wash on chest and back 3 times weekly.  Lather onto skin and leave on for several  minutes before washing off. (Patient not taking: Reported on 6/18/2024) 120 mL 12    clobetasol 0.05 % External Ointment Apply 1 Application. topically 2 (two) times daily. (Patient not taking: Reported on 6/18/2024) 30 g 2         Allergies:  Allergies   No Known Allergies     PHYSICAL EXAM:   Physical Exam   Height 71\", weight 220 lb (99.8 kg).        General Appearance: Well nourished, well developed, no apparent distress.   HEENT: Normocephalic and atraumatic.   Cardiovascular: Normal rate, regular rhythm and normal heart sounds.    Pulmonary/Chest: Effort normal and breath sounds normal.   Abdominal: Soft. Bowel sounds are normal.   Musculoskeletal: no joint tenderness or redness    Neurological:  Patient is awake, alert and oriented to person, place and time  Normal memory,  Attention/concentration, speech and language.    Cranial Nerves:      II: Visual fields: normal  III: Pupils: equal, round, reactive to light  III,IV,VI: Extra Ocular Movements: intact  V: Facial sensation: intact  VII: Facial strength: intact  VIII: Hearing: intact  IX: Palate: intact  XI: Shoulder shrug: intact  XII: Tongue movement: normal    Motor: Normal tone. Strength is  5 out of 5 in all extremities bilaterally.    Sensory: Sensory examination is normal to light touch and pinprick     Coordination: Finger-to-nose normal bilaterally without evidence of dysmetria.    Gait: normal casual gait         ASSESSMENT/PLAN:          ICD-10-CM     1. Chronic nonintractable headache, unspecified headache type  R51.9       G89.29                    Headaches slightly worse due to lower dose of Amitriptyline     Due to suspected sexual dysfunction SE from Amitriptyline, Urology recommended to wean off amitriptyline       Wean off Amitriptyline as instructed- 20 mg nightly x 1 week then 10 mg x 1 week then stop  Start Depakote  mg nightly      Continue gabapentin 600 mg 3 times a day        Consider Sleep study given daytime fatigue         Follow up in about 6 months     See orders and medications filed with this encounter. The patient indicates understanding of these issues and agrees with the plan.       Orders Placed This Encounter    divalproex  MG Oral Tablet 24 Hr       Sig: Take 1 tablet (250 mg total) by mouth at bedtime.       Dispense:  30 tablet       Refill:  2    amitriptyline 10 MG Oral Tab       Sig: Take 2 tablets (20 mg total) by mouth nightly.       Dispense:  60 tablet       Refill:  0            Christophe Lyn MD  [facility] [place]        Meds This Visit:  Requested Prescriptions        No prescriptions requested or ordered in this encounter         Imaging & Referrals:  None      ID#[id number]

## 2024-09-25 RX ORDER — DIVALPROEX SODIUM 250 MG/1
250 TABLET, FILM COATED, EXTENDED RELEASE ORAL NIGHTLY
Qty: 30 TABLET | Refills: 2 | OUTPATIENT
Start: 2024-09-25

## 2024-09-25 RX ORDER — DIVALPROEX SODIUM 250 MG/1
250 TABLET, FILM COATED, EXTENDED RELEASE ORAL NIGHTLY
Qty: 30 TABLET | Refills: 5 | Status: SHIPPED | OUTPATIENT
Start: 2024-09-25

## 2024-10-18 ENCOUNTER — OFFICE VISIT (OUTPATIENT)
Dept: PULMONOLOGY | Facility: CLINIC | Age: 34
End: 2024-10-18
Payer: COMMERCIAL

## 2024-10-18 VITALS
OXYGEN SATURATION: 98 % | DIASTOLIC BLOOD PRESSURE: 60 MMHG | WEIGHT: 220 LBS | BODY MASS INDEX: 31 KG/M2 | RESPIRATION RATE: 14 BRPM | HEART RATE: 72 BPM | SYSTOLIC BLOOD PRESSURE: 110 MMHG

## 2024-10-18 DIAGNOSIS — G47.33 OSA (OBSTRUCTIVE SLEEP APNEA): Primary | ICD-10-CM

## 2024-10-18 PROCEDURE — 3078F DIAST BP <80 MM HG: CPT | Performed by: INTERNAL MEDICINE

## 2024-10-18 PROCEDURE — 99204 OFFICE O/P NEW MOD 45 MIN: CPT | Performed by: INTERNAL MEDICINE

## 2024-10-18 PROCEDURE — 3074F SYST BP LT 130 MM HG: CPT | Performed by: INTERNAL MEDICINE

## 2024-10-18 NOTE — H&P
Referring Physician  Yolanda Salvador DO    Chief Complaint  Sleep apnea    History of Present Illness  Patient is a 34-year-old male who presents to pulmonary clinic for initial visit.  Had recent polysomnography with evidence of mild sleep apnea.  Admits to some hypersomnia and fatigue throughout the day.  Denies significant history of known lung disease dyspnea symptoms.    Review of Systems  Constitutional: denies weight loss, fevers, chills, weakness, + fatigue  HEENT: denies epistaxis, sore throat, postnasal drip  Cardio: denies chest pain, chest pressure, palpitations  Respiratory: denies dyspnea, cough, wheezing, hemoptysis   GI: denies nausea, vomiting, abdominal pain  : denies dysuria, hematuria  Musculoskeletal: denies arthralgia, myalgia  Integumentary: denies rash, itching  Neurological: denies syncope, weakness, dizziness,   Psychiatric: denies depression, anxiety  Hematologic: denies bruising    Past Medical History  Past Medical History:    History of abdominal pain    Insomnia        Surgical History  Past Surgical History:   Procedure Laterality Date    Colonoscopy      Egd N/A 4/14/2017    Procedure: ESOPHAGOGASTRODUODENOSCOPY, COLONOSCOPY, POSSIBLE BIOPSY, POSSIBLE POLYPECTOMY 10720, 03931;  Surgeon: Stan Grant MD;  [facility] [facility], [facility]       Family History  Family History   Problem Relation Age of Onset    Hypertension Maternal Grandmother     Asthma Maternal Grandmother     Cancer Maternal Grandmother         Skin cancer    Cancer Maternal Grandfather         Throat cancer    Anemia Father     Heart Disorder Father         Bicuspid aortic valve    Hypertension Father     Pulmonary Disease Father         Scleroderma causing interstitial lung disease    Anemia Mother     Heart Disorder Paternal Grandfather         Subacute myocarditis    Hypertension Paternal Grandmother         Social History  Tobacco: Denies  Alcohol: Denies significant  Illicit Drugs:  Denies    Medications  Medications Ordered Prior to Encounter[1]    Allergies  Allergies[2]    Physical Exam  Constitutional: no acute distress  HEENT: PERRL  Neck: supple, no JVD  Cardio: RRR, S1 S2  Respiratory: clear to auscultation bilaterally, no wheezing, rales, rhonchi, crackles  GI: abdomen soft, non tender, active bowel sounds, no organomegaly  Extremities: no clubbing, cyanosis, edema  Neurologic: no gross motor deficits  Skin: warm, dry  Lymphatic: no supraclavicular lymphadenopathy     Assessment  1.  Mild DOUGIE    Plan  -Patient presents today for pulmonary follow-up after recent polysomnography with evidence of mild DOUGIE.  I reviewed polysomnography results with the patient.  I have ordered auto CPAP for the patient.  Patient should be seen in pulmonary clinic between 30 and 90 days to review efficacy and compliance        Sara Scott DO  Pulmonary Critical Care Medicine  [facility]  10/18/2024  12:17 PM        [1]   Current Outpatient Medications on File Prior to Visit   Medication Sig Dispense Refill    DIVALPROEX  MG Oral Tablet 24 Hr TAKE 1 TABLET(250 MG) BY MOUTH AT BEDTIME 30 tablet 5    MINOXIDIL 2.5 MG Oral Tab TAKE 1 TABLET( 2.5 MG TOTAL) BY MOUTH DAILY 180 tablet 0    gabapentin 600 MG Oral Tab Take 1 tablet (600 mg total) by mouth 3 (three) times daily. 90 tablet 5    Ciclopirox 8 % External Solution APPLY 1 APPLICATION TOPICALLY TO THE AFFECTED AREA EVERY NIGHT 1 each 3    Sildenafil Citrate 100 MG Oral Tab Take 1 tablet (100 mg total) by mouth daily as needed for Erectile Dysfunction. 5 tablet 5    Multiple Vitamin (MULTIVITAMIN ADULT OR) Take 1 tablet by mouth daily.      nystatin-triamcinolone 100,000-0.1 Units/g-% External Ointment Apply twice daily  Monday-Friday to affected areas of rash in armpits . 60 g 3    ketoconazole 2 % External Shampoo His body wash on chest and back 3 times weekly.  Lather onto skin and leave on for several minutes before washing off. 120 mL 12     clobetasol 0.05 % External Ointment Apply 1 Application. topically 2 (two) times daily. 30 g 2    Melatonin 5 MG Oral Tab Take 1 tablet (5 mg total) by mouth as needed.      acetaminophen 500 MG Oral Tab Take 1 tablet (500 mg total) by mouth every 6 (six) hours as needed for Pain.      amitriptyline 10 MG Oral Tab Take 2 tablets (20 mg total) by mouth nightly. 60 tablet 0     No current facility-administered medications on file prior to visit.   [2] No Known Allergies

## 2024-11-04 ENCOUNTER — PATIENT MESSAGE (OUTPATIENT)
Dept: PULMONOLOGY | Facility: CLINIC | Age: 34
End: 2024-11-04

## 2024-11-04 ENCOUNTER — PATIENT MESSAGE (OUTPATIENT)
Dept: NEUROLOGY | Facility: CLINIC | Age: 34
End: 2024-11-04

## 2024-11-04 DIAGNOSIS — G47.33 OSA (OBSTRUCTIVE SLEEP APNEA): Primary | ICD-10-CM

## 2024-11-04 NOTE — TELEPHONE ENCOUNTER
Dr. Scott - Please see patient's message requesting decrease in CPAP pressure.  Below is current data download.

## 2024-11-05 ENCOUNTER — TELEPHONE (OUTPATIENT)
Dept: PULMONOLOGY | Facility: CLINIC | Age: 34
End: 2024-11-05

## 2024-11-05 NOTE — TELEPHONE ENCOUNTER
Also see 11/4/2024 Quotations Book Message - patient states he has been having issues with pressure from Cpap and unpleasant experience when breathing out while wearing Cpap.  Patient is able to sleep but is struggling staying asleep throughout the night.  Please call.  Thank you.

## 2024-11-06 NOTE — TELEPHONE ENCOUNTER
From TE 11/5/24  Jamison Hernandez23 hours ago (12:29 PM)     Also see 11/4/2024 MyChart Message - patient states he has been having issues with pressure from Cpap and unpleasant experience when breathing out while wearing Cpap.  Patient is able to sleep but is struggling staying asleep throughout the night.  Please call.  Thank you.      Dr. Scott - Please see message below and [facility] from patient.

## 2024-11-06 NOTE — TELEPHONE ENCOUNTER
Spoke with patient states he is able to fall asleep fine, but wakes up between 1:30am-2am, advised patient that [employer] message 11/4/24 was forwarded to Dr. Scott for pressure decrease, will contact him when Dr. Scott responds, patient verbalized understanding.

## 2024-11-06 NOTE — TELEPHONE ENCOUNTER
Sara Scott, DO  [facility] Clinical Staff41 minutes ago (3:07 PM)     Are we able to place order to decrease maximal pressure setting to 14 CWP and I will sign off on it.  Patient should request download data over 30 days after he has setting adjustment to see if he tolerates lesser pressure better.

## 2024-11-11 RX ORDER — DIVALPROEX SODIUM 250 MG/1
250 TABLET, FILM COATED, EXTENDED RELEASE ORAL NIGHTLY
Qty: 30 TABLET | Refills: 5 | Status: SHIPPED | OUTPATIENT
Start: 2024-11-11

## 2024-11-20 RX ORDER — DIVALPROEX SODIUM 500 MG/1
500 TABLET, FILM COATED, EXTENDED RELEASE ORAL NIGHTLY
Qty: 90 TABLET | Refills: 1 | Status: SHIPPED | OUTPATIENT
Start: 2024-11-20

## 2024-12-19 RX ORDER — MINOXIDIL 2.5 MG/1
2.5 TABLET ORAL DAILY
Qty: 180 TABLET | Refills: 0 | Status: SHIPPED | OUTPATIENT
Start: 2024-12-19

## 2024-12-19 NOTE — TELEPHONE ENCOUNTER
Refill Request for medication(s): MINOXIDIL 2.5MG TABLETS     Last Office Visit: 06/18/24    Last Refill: 08/07/24    Pharmacy, Dosage verified: [id number] - [address] AT [facility] [address], [phone], [phone]    Condition Update (if applicable): Appt 06/2025 for 1 year follow up    Rx pended and sent to provider for approval, please advise. Thank You!

## 2025-01-20 DIAGNOSIS — R20.2 TINGLING IN EXTREMITIES: ICD-10-CM

## 2025-01-20 DIAGNOSIS — G44.029 CHRONIC CLUSTER HEADACHE, NOT INTRACTABLE: ICD-10-CM

## 2025-01-20 DIAGNOSIS — G89.29 CHRONIC NONINTRACTABLE HEADACHE, UNSPECIFIED HEADACHE TYPE: ICD-10-CM

## 2025-01-20 DIAGNOSIS — R51.9 CHRONIC NONINTRACTABLE HEADACHE, UNSPECIFIED HEADACHE TYPE: ICD-10-CM

## 2025-01-20 DIAGNOSIS — G43.109 COMPLICATED MIGRAINE: ICD-10-CM

## 2025-01-20 DIAGNOSIS — M54.6 ACUTE MIDLINE THORACIC BACK PAIN: ICD-10-CM

## 2025-01-20 RX ORDER — GABAPENTIN 600 MG/1
600 TABLET ORAL 3 TIMES DAILY
Qty: 90 TABLET | Refills: 5 | Status: SHIPPED | OUTPATIENT
Start: 2025-01-20

## 2025-01-20 NOTE — TELEPHONE ENCOUNTER
Requested Prescriptions     Pending Prescriptions Disp Refills    GABAPENTIN 600 MG Oral Tab [Pharmacy Med Name: GABAPENTIN 600MG TABLETS] 90 tablet 5     Sig: TAKE 1 TABLET(600 MG) BY MOUTH THREE TIMES DAILY       Last OV: 6/25/2024   Next OV:     [place]  Gabap[place]     Dispensed Written Strength Quantity Refills Days Supply Provider Pharmacy   GABAPENTIN 12/22/2024 08/02/2024 600 mg 90  30 MUQTADACHRISTOPHE ACEVEDO MD   GABAPENTIN 11/24/2024 08/02/2024 600 mg 90  30 MUQTADARCHRISTOPHE MD   GABAPENTIN 10/28/2024 08/02/2024 600 mg 90  30 MUQTADARCHRISTOPHE MD   GABAPENTIN 09/30/2024 08/02/2024 600 mg 90  30 MUQTADARCHRISTOPHE MD   GABAPENTIN 08/31/2024 08/02/2024 600 mg 90  30 MUQTADARCHRISTOPHE MD       Last office visit plan;    ASSESSMENT/PLAN:          ICD-10-CM     1. Chronic nonintractable headache, unspecified headache type  R51.9       G89.29                    Headaches slightly worse due to lower dose of Amitriptyline     Due to suspected sexual dysfunction SE from Amitriptyline, Urology recommended to wean off amitriptyline       Wean off Amitriptyline as instructed- 20 mg nightly x 1 week then 10 mg x 1 week then stop  Start Depakote  mg nightly      Continue gabapentin 600 mg 3 times a day        Consider Sleep study given daytime fatigue        Follow up in about 6 months     See orders and medications filed with this encounter. The patient indicates understanding of these issues and agrees with the plan.       Orders Placed This Encounter    divalproex  MG Oral Tablet 24 Hr       Sig: Take 1 tablet (250 mg total) by mouth at bedtime.       Dispense:  30 tablet       Refill:  2    amitriptyline 10 MG Oral Tab       Sig: Take 2 tablets (20 mg total) by mouth nightly.       Dispense:  60 tablet       Refill:  0            Christophe Lyn MD  [facility] [place]        Meds This Visit:  Requested Prescriptions        No prescriptions requested or  ordered in this encounter         Imaging & Referrals:  None      ID#[id number]        Instructions      Return in about 6 months (around 12/25/2024).

## 2025-01-30 ENCOUNTER — TELEPHONE (OUTPATIENT)
Dept: PULMONOLOGY | Facility: CLINIC | Age: 35
End: 2025-01-30

## 2025-01-30 NOTE — TELEPHONE ENCOUNTER
Received request from [facility] for CPAP resupply via parachute. Placed in Dr. Scott's folder to sign.

## 2025-02-11 NOTE — TELEPHONE ENCOUNTER
CPAP supply order from [facility] has been signed and faxed back via [employer] and order has been sent for scanning.

## 2025-02-27 ENCOUNTER — OFFICE VISIT (OUTPATIENT)
Dept: PULMONOLOGY | Facility: CLINIC | Age: 35
End: 2025-02-27
Payer: COMMERCIAL

## 2025-02-27 VITALS
DIASTOLIC BLOOD PRESSURE: 68 MMHG | WEIGHT: 231.38 LBS | HEIGHT: 71 IN | HEART RATE: 80 BPM | RESPIRATION RATE: 12 BRPM | SYSTOLIC BLOOD PRESSURE: 103 MMHG | OXYGEN SATURATION: 100 % | BODY MASS INDEX: 32.39 KG/M2

## 2025-02-27 DIAGNOSIS — G47.33 OSA (OBSTRUCTIVE SLEEP APNEA): Primary | ICD-10-CM

## 2025-02-27 PROCEDURE — 3074F SYST BP LT 130 MM HG: CPT | Performed by: INTERNAL MEDICINE

## 2025-02-27 PROCEDURE — 99213 OFFICE O/P EST LOW 20 MIN: CPT | Performed by: INTERNAL MEDICINE

## 2025-02-27 PROCEDURE — 3078F DIAST BP <80 MM HG: CPT | Performed by: INTERNAL MEDICINE

## 2025-02-27 PROCEDURE — 3008F BODY MASS INDEX DOCD: CPT | Performed by: INTERNAL MEDICINE

## 2025-02-27 NOTE — PROGRESS NOTES
Referring Physician  Yolanda Salvador DO    History of Present Illness  Patient seen today for follow-up visit to pulmonary clinic.  Using CPAP device on nightly basis. tolerates device well.  Denies significant dyspnea symptoms.    Medications  Medications Ordered Prior to Encounter[1]    Allergies  Allergies[2]    Physical Exam  Constitutional: no acute distress  HEENT: PERRL  Neck: supple, no JVD  Cardio: RRR, S1 S2  Respiratory: clear to auscultation bilaterally, no wheezing, rales, rhonchi, crackles  GI: abdomen soft, non tender, active bowel sounds, no organomegaly  Extremities: no clubbing, cyanosis, edema  Neurologic: no gross motor deficits  Skin: warm, dry  Lymphatic: no supraclavicular lymphadenopathy     Assessment  1.  DOUGIE    Plan  -Patient seen today for follow-up visit to pulmonary clinic.  Using CPAP device on nightly basis with usage 97% of days over last 30 days with average usage of 7 hours and 38 minutes.  AHI is 1.3.  Advised patient to continue using CPAP device since he is benefiting from it.  Return to pulmonary clinic in 1 year duration    Sara Scott DO  Pulmonary Critical Care Medicine  [facility]  2/27/2025  1:01 PM        [1]   Current Outpatient Medications on File Prior to Visit   Medication Sig Dispense Refill    GABAPENTIN 600 MG Oral Tab TAKE 1 TABLET(600 MG) BY MOUTH THREE TIMES DAILY 90 tablet 5    MINOXIDIL 2.5 MG Oral Tab TAKE 1 TABLET( 2.5 MG TOTAL) BY MOUTH DAILY 180 tablet 0    divalproex  MG Oral Tablet 24 Hr Take 1 tablet (500 mg total) by mouth nightly. AT BEDTIME 90 tablet 1    Ciclopirox 8 % External Solution APPLY 1 APPLICATION TOPICALLY TO THE AFFECTED AREA EVERY NIGHT 1 each 3    Sildenafil Citrate 100 MG Oral Tab Take 1 tablet (100 mg total) by mouth daily as needed for Erectile Dysfunction. 5 tablet 5    Multiple Vitamin (MULTIVITAMIN ADULT OR) Take 1 tablet by mouth daily.      nystatin-triamcinolone 100,000-0.1 Units/g-% External Ointment Apply twice  daily  Monday-Friday to affected areas of rash in armpits . 60 g 3    ketoconazole 2 % External Shampoo His body wash on chest and back 3 times weekly.  Lather onto skin and leave on for several minutes before washing off. 120 mL 12    clobetasol 0.05 % External Ointment Apply 1 Application. topically 2 (two) times daily. 30 g 2    Melatonin 5 MG Oral Tab Take 1 tablet (5 mg total) by mouth as needed.      acetaminophen 500 MG Oral Tab Take 1 tablet (500 mg total) by mouth every 6 (six) hours as needed for Pain.      amitriptyline 10 MG Oral Tab Take 2 tablets (20 mg total) by mouth nightly. 60 tablet 0     No current facility-administered medications on file prior to visit.   [2] No Known Allergies

## 2025-03-20 RX ORDER — KETOCONAZOLE 20 MG/ML
SHAMPOO, SUSPENSION TOPICAL
Qty: 120 ML | Refills: 5 | Status: SHIPPED | OUTPATIENT
Start: 2025-03-20

## 2025-03-20 NOTE — TELEPHONE ENCOUNTER
Refill Request for medication(s):     Last Office Visit: 06/18/2024    Last Refill: 06/14/2023    Pharmacy, Dosage verified: yes    Condition Update (if applicable):     Rx pended and sent to provider for approval, please advise. Thank You!

## 2025-05-06 ENCOUNTER — TELEPHONE (OUTPATIENT)
Dept: NEUROLOGY | Facility: CLINIC | Age: 35
End: 2025-05-06

## 2025-05-06 RX ORDER — MINOXIDIL 2.5 MG/1
2.5 TABLET ORAL DAILY
Qty: 45 TABLET | Refills: 0 | Status: SHIPPED | OUTPATIENT
Start: 2025-05-06

## 2025-05-06 NOTE — TELEPHONE ENCOUNTER
Rcvd fax from [facility] rx requesting refill for Divalproex extended release 500mg.

## 2025-05-06 NOTE — TELEPHONE ENCOUNTER
Refill Request for medication(s):     Last Office Visit: 06/18/2024.     Upcoming appt 06/17/2025    Last Refill: 12/19/2024    Pharmacy, Dosage verified: yes    
no

## 2025-05-07 DIAGNOSIS — R51.9 CHRONIC NONINTRACTABLE HEADACHE, UNSPECIFIED HEADACHE TYPE: Primary | ICD-10-CM

## 2025-05-07 DIAGNOSIS — G89.29 CHRONIC NONINTRACTABLE HEADACHE, UNSPECIFIED HEADACHE TYPE: Primary | ICD-10-CM

## 2025-05-07 NOTE — TELEPHONE ENCOUNTER
Per telephone encounter 11/04/2024, patient was increased to 500 mg nightly.       Medication: divalproex  MG Oral Tablet 24 Hr      Date of last refill: 11/20/2024 (#90/1)  [place]     Last office visit: 06/25/2024  Due back to clinic per last office note:  Around 12/25/2024  Date next office visit scheduled:    Future Appointments   Date Time Provider Department Center   6/17/2025  7:30 AM Torito Bhatt MD [facility]   3/19/2026  1:00 PM Sara Scott,  [facility]           Last OV note recommendation:    ASSESSMENT/PLAN:          ICD-10-CM     1. Chronic nonintractable headache, unspecified headache type  R51.9       G89.29                    Headaches slightly worse due to lower dose of Amitriptyline     Due to suspected sexual dysfunction SE from Amitriptyline, Urology recommended to wean off amitriptyline       Wean off Amitriptyline as instructed- 20 mg nightly x 1 week then 10 mg x 1 week then stop  Start Depakote  mg nightly      Continue gabapentin 600 mg 3 times a day        Consider Sleep study given daytime fatigue        Follow up in about 6 months

## 2025-05-09 RX ORDER — DIVALPROEX SODIUM 500 MG/1
500 TABLET, FILM COATED, EXTENDED RELEASE ORAL NIGHTLY
Qty: 90 TABLET | Refills: 0 | Status: SHIPPED | OUTPATIENT
Start: 2025-05-09

## 2025-05-12 RX ORDER — MINOXIDIL 2.5 MG/1
2.5 TABLET ORAL DAILY
Qty: 45 TABLET | Refills: 0 | OUTPATIENT
Start: 2025-05-12

## 2025-05-12 NOTE — TELEPHONE ENCOUNTER
Current refill request refused due to refill is either a duplicate request or has active refills at the pharmacy.  Check previous templates.    Requested Prescriptions     Refused Prescriptions Disp Refills    MINOXIDIL 2.5 MG Oral Tab [Pharmacy Med Name: MINOXIDIL 2.5MG TABLETS] 45 tablet 0     Sig: TAKE 1 TABLET(2.5 MG) BY MOUTH DAILY     Refused By: CHRISTELLE SON     Reason for Refusal: Request already responded to by other means (e.g. phone or fax)

## 2025-05-15 NOTE — TELEPHONE ENCOUNTER
Already sent to pharmacy    Medication: Divalproex 500 mg     Date of last refill: 05/09/2025 (#90/0)  [place]009/2025     Last office visit: 06/25/2024  Due back to clinic per last office note:  6 months  Date next office visit scheduled:    Future Appointments   Date Time Provider Department Center   6/17/2025  7:30 AM Torito Bhatt MD [facility]   3/19/2026  1:00 PM Sara Scott,  [facility]           Last OV note recommendation:    Headaches slightly worse due to lower dose of Amitriptyline     Due to suspected sexual dysfunction SE from Amitriptyline, Urology recommended to wean off amitriptyline       Wean off Amitriptyline as instructed- 20 mg nightly x 1 week then 10 mg x 1 week then stop  Start Depakote  mg nightly      Continue gabapentin 600 mg 3 times a day        Consider Sleep study given daytime fatigue        Follow up in about 6 months

## 2025-07-08 ENCOUNTER — OFFICE VISIT (OUTPATIENT)
Dept: DERMATOLOGY CLINIC | Facility: CLINIC | Age: 35
End: 2025-07-08
Payer: COMMERCIAL

## 2025-07-08 DIAGNOSIS — R51.9 CHRONIC NONINTRACTABLE HEADACHE, UNSPECIFIED HEADACHE TYPE: ICD-10-CM

## 2025-07-08 DIAGNOSIS — G89.29 CHRONIC NONINTRACTABLE HEADACHE, UNSPECIFIED HEADACHE TYPE: ICD-10-CM

## 2025-07-08 DIAGNOSIS — L81.4 LENTIGINES: ICD-10-CM

## 2025-07-08 DIAGNOSIS — G43.109 COMPLICATED MIGRAINE: ICD-10-CM

## 2025-07-08 DIAGNOSIS — M54.6 ACUTE MIDLINE THORACIC BACK PAIN: ICD-10-CM

## 2025-07-08 DIAGNOSIS — D18.01 CHERRY ANGIOMA: ICD-10-CM

## 2025-07-08 DIAGNOSIS — D22.9 MULTIPLE BENIGN NEVI: ICD-10-CM

## 2025-07-08 DIAGNOSIS — L82.1 SEBORRHEIC KERATOSES: Primary | ICD-10-CM

## 2025-07-08 DIAGNOSIS — R20.2 TINGLING IN EXTREMITIES: ICD-10-CM

## 2025-07-08 DIAGNOSIS — G44.029 CHRONIC CLUSTER HEADACHE, NOT INTRACTABLE: ICD-10-CM

## 2025-07-08 PROCEDURE — 99214 OFFICE O/P EST MOD 30 MIN: CPT | Performed by: STUDENT IN AN ORGANIZED HEALTH CARE EDUCATION/TRAINING PROGRAM

## 2025-07-08 RX ORDER — MINOXIDIL 2.5 MG/1
2.5 TABLET ORAL DAILY
Qty: 90 TABLET | Refills: 3 | Status: SHIPPED | OUTPATIENT
Start: 2025-07-08

## 2025-07-08 NOTE — PROGRESS NOTES
Established patient     Referred by:   No referring provider defined for this encounter.     CHIEF COMPLAINT: FBSE    HISTORY OF PRESENT ILLNESS: .    - No particular lesions of concern.       DERM HISTORY:  History of skin cancer: No  History of melanoma: No    FAMILY HISTORY:  History of melanoma: No    PAST MEDICAL HISTORY:  Past Medical History[1]    REVIEW OF SYSTEMS:  Constitutional: Denies fever, chills, unintentional weight loss.   Skin as per HPI    Medications  Current Medications[2]    PHYSICAL EXAM:  Patient declined chaperone   General: awake, alert, no acute distress  Skin: Skin exam was performed today including the following: head and face, scalp, neck, chest (including breasts and axillae), abdomen, back, bilateral upper extremities, bilateral lower extremities, hands, feet, digits, nails. Pertinent findings include:   - Scattered bright red-purple dome-shaped papules on the trunk and extremities   - Scattered light brown stellate macules on sun exposed sites  - Scattered, evenly colored, round brown macules and papules with regular borders on the trunk and extremities  - Numerous scattered skin-colored and brown, waxy, stuck-on papules and plaques on the trunk and extremities      ASSESSMENT & PLAN:  Pathophysiology of diagnoses discussed with patient.  Therapeutic options reviewed. Risks, benefits, and alternatives discussed with patient. Instructions reviewed at length.    #Lentigines  #Seborrheic keratoses   #Cherry angiomas   - Reassurance provided regarding the benign nature of these lesions.    #Multiple benign nevi  - Complete skin exam performed today with no outlier lesions identified   - Reassured patient of benign nature of these lesions.   - Recommend daily photoprotection with broad-spectrum sunscreen, avoidance of sun during peak hours, and sun protective clothing.    - Dermoscopy was used for physical examination of pigmented lesions during today's office visit.    #Onychomycosis,  resolved  - Ciclopirox 3 times weekly    #Telogen effluvium  #Androgenetic alopecia  - Continue oral minoxidil 2.5 mg daily (half a tablet) with food  - Discussed medication usage, dosage, risks, benefits and side effects.  ADR discusssed, including, but not limited to:  hypotension, tachycardia, pericardial effusion, edema, and increased facial/body hirsutism.       Return to clinic: 1 year  or sooner if something concerning arises     Torito Bhatt MD         [1]   Past Medical History:   History of abdominal pain    Insomnia   [2]   Current Outpatient Medications   Medication Sig Dispense Refill    divalproex  MG Oral Tablet 24 Hr Take 1 tablet (500 mg total) by mouth nightly. AT BEDTIME 90 tablet 0    MINOXIDIL 2.5 MG Oral Tab TAKE 1 TABLET( 2.5 MG TOTAL) BY MOUTH DAILY 45 tablet 0    ketoconazole 2 % External Shampoo USE AS BODY WASH ON CHEST AND BACK 3 TIMES WEEKLY. LATHER ONTO SKIN AND LEAVE ON FOR SEVERAL MINUTES BEFORE WASHING  mL 5    GABAPENTIN 600 MG Oral Tab TAKE 1 TABLET(600 MG) BY MOUTH THREE TIMES DAILY 90 tablet 5    amitriptyline 10 MG Oral Tab Take 2 tablets (20 mg total) by mouth nightly. 60 tablet 0    Ciclopirox 8 % External Solution APPLY 1 APPLICATION TOPICALLY TO THE AFFECTED AREA EVERY NIGHT 1 each 3    Sildenafil Citrate 100 MG Oral Tab Take 1 tablet (100 mg total) by mouth daily as needed for Erectile Dysfunction. 5 tablet 5    Multiple Vitamin (MULTIVITAMIN ADULT OR) Take 1 tablet by mouth daily.      nystatin-triamcinolone 100,000-0.1 Units/g-% External Ointment Apply twice daily  Monday-Friday to affected areas of rash in armpits . 60 g 3    clobetasol 0.05 % External Ointment Apply 1 Application. topically 2 (two) times daily. 30 g 2    Melatonin 5 MG Oral Tab Take 1 tablet (5 mg total) by mouth as needed.      acetaminophen 500 MG Oral Tab Take 1 tablet (500 mg total) by mouth every 6 (six) hours as needed for Pain.

## 2025-07-10 NOTE — TELEPHONE ENCOUNTER
Medication: GABAPENTIN 600 MG Oral Tab      Date of last refill: 01/20/25 (#90/5)  [place]06[place]25     Last office visit: 06/25/24  Due back to clinic per last office note:  Return in about 6 months (around 12/25/2024)   Date next office visit scheduled:    Future Appointments   Date Time Provider Department Center   8/8/2025  1:50 PM Christophe Lyn MD [facility]   3/19/2026  1:00 PM Sara Scott,  [facility]   7/7/2026  7:15 AM Torito Bhatt MD [facility]           Last OV note recommendation:    ASSESSMENT/PLAN:          ICD-10-CM     1. Chronic nonintractable headache, unspecified headache type  R51.9       G89.29                    Headaches slightly worse due to lower dose of Amitriptyline     Due to suspected sexual dysfunction SE from Amitriptyline, Urology recommended to wean off amitriptyline       Wean off Amitriptyline as instructed- 20 mg nightly x 1 week then 10 mg x 1 week then stop  Start Depakote  mg nightly      Continue gabapentin 600 mg 3 times a day        Consider Sleep study given daytime fatigue        Follow up in about 6 months     See orders and medications filed with this encounter. The patient indicates understanding of these issues and agrees with the plan.       Orders Placed This Encounter    divalproex  MG Oral Tablet 24 Hr       Sig: Take 1 tablet (250 mg total) by mouth at bedtime.       Dispense:  30 tablet       Refill:  2    amitriptyline 10 MG Oral Tab       Sig: Take 2 tablets (20 mg total) by mouth nightly.       Dispense:  60 tablet       Refill:  0

## 2025-07-11 DIAGNOSIS — R20.2 TINGLING IN EXTREMITIES: ICD-10-CM

## 2025-07-11 DIAGNOSIS — G44.029 CHRONIC CLUSTER HEADACHE, NOT INTRACTABLE: ICD-10-CM

## 2025-07-11 DIAGNOSIS — R51.9 CHRONIC NONINTRACTABLE HEADACHE, UNSPECIFIED HEADACHE TYPE: ICD-10-CM

## 2025-07-11 DIAGNOSIS — G43.109 COMPLICATED MIGRAINE: ICD-10-CM

## 2025-07-11 DIAGNOSIS — G89.29 CHRONIC NONINTRACTABLE HEADACHE, UNSPECIFIED HEADACHE TYPE: ICD-10-CM

## 2025-07-11 DIAGNOSIS — M54.6 ACUTE MIDLINE THORACIC BACK PAIN: ICD-10-CM

## 2025-07-11 RX ORDER — GABAPENTIN 600 MG/1
600 TABLET ORAL 3 TIMES DAILY
Qty: 90 TABLET | Refills: 5 | Status: SHIPPED | OUTPATIENT
Start: 2025-07-11

## 2025-07-14 RX ORDER — GABAPENTIN 600 MG/1
600 TABLET ORAL 3 TIMES DAILY
Qty: 90 TABLET | Refills: 5 | Status: SHIPPED | OUTPATIENT
Start: 2025-07-14

## 2025-07-14 NOTE — TELEPHONE ENCOUNTER
Requested Prescriptions     Pending Prescriptions Disp Refills    GABAPENTIN 600 MG Oral Tab [Pharmacy Med Name: GABAPENTIN 600MG TABLETS] 90 tablet 5     Sig: TAKE 1 TABLET(600 MG) BY MOUTH THREE TIMES DAILY     Duplicate request    Signed 3 days ago (7/11/2025):   gabapentin 600 MG Oral Tab   Sig: Take 1 tablet (600 mg total) by mouth 3 (three) times daily.   Disp: 90 tablet    Refills: 5   Signed by: Christophe Lyn MD

## 2025-08-05 DIAGNOSIS — R51.9 CHRONIC NONINTRACTABLE HEADACHE, UNSPECIFIED HEADACHE TYPE: ICD-10-CM

## 2025-08-05 DIAGNOSIS — G89.29 CHRONIC NONINTRACTABLE HEADACHE, UNSPECIFIED HEADACHE TYPE: ICD-10-CM

## 2025-08-06 RX ORDER — DIVALPROEX SODIUM 500 MG/1
500 TABLET, FILM COATED, EXTENDED RELEASE ORAL NIGHTLY
Qty: 90 TABLET | Refills: 0 | Status: SHIPPED | OUTPATIENT
Start: 2025-08-06

## 2025-08-18 ENCOUNTER — OFFICE VISIT (OUTPATIENT)
Dept: NEUROLOGY | Facility: CLINIC | Age: 35
End: 2025-08-18

## 2025-08-18 VITALS
OXYGEN SATURATION: 96 % | BODY MASS INDEX: 31.78 KG/M2 | HEIGHT: 71 IN | HEART RATE: 74 BPM | RESPIRATION RATE: 16 BRPM | DIASTOLIC BLOOD PRESSURE: 72 MMHG | WEIGHT: 227 LBS | SYSTOLIC BLOOD PRESSURE: 110 MMHG

## 2025-08-18 DIAGNOSIS — G89.29 CHRONIC NONINTRACTABLE HEADACHE, UNSPECIFIED HEADACHE TYPE: Primary | ICD-10-CM

## 2025-08-18 DIAGNOSIS — R51.9 CHRONIC NONINTRACTABLE HEADACHE, UNSPECIFIED HEADACHE TYPE: Primary | ICD-10-CM

## 2025-08-18 PROCEDURE — 3078F DIAST BP <80 MM HG: CPT | Performed by: OTHER

## 2025-08-18 PROCEDURE — 3074F SYST BP LT 130 MM HG: CPT | Performed by: OTHER

## 2025-08-18 PROCEDURE — 3008F BODY MASS INDEX DOCD: CPT | Performed by: OTHER

## 2025-08-18 PROCEDURE — 99213 OFFICE O/P EST LOW 20 MIN: CPT | Performed by: OTHER

## (undated) NOTE — MR AVS SNAPSHOT
[address], [place] [id number]               Thank you for choosing us for your health care visit with Nell Merino MD.  We are glad to serve you and happy to provide you with this summary ? Please allow the office 48-72 hours to fill the prescription. ? Patient must present photo ID at time of .   If a designated family member will be picking up prescription, office must be given name of individual in advance and they must present a Mar 21, 2017  4:40 PM   Follow up with MD Yassine Fonseca 26 ([facility])    [address] [place] [id number]              Allergies as of Jan 03, 2017     No Known Allergies office, you can view your past visit information in [employer] by going to Visits < Visit Summaries. [employer] questions? Call [phone] for help. [employer] is NOT to be used for urgent needs. For medical emergencies, dial 911.            Visit [facility]

## (undated) NOTE — LETTER
12/08/2020  Nadia Pretty  [address] [facility]    Dear Marissa Savage,    We are contacting you from Dr. Romeo Staton office. Your health is important to us.   Therefore, we are sending this friendly reminder that you have the following overdue